# Patient Record
Sex: FEMALE | Race: WHITE | NOT HISPANIC OR LATINO | Employment: FULL TIME | URBAN - NONMETROPOLITAN AREA
[De-identification: names, ages, dates, MRNs, and addresses within clinical notes are randomized per-mention and may not be internally consistent; named-entity substitution may affect disease eponyms.]

---

## 2018-02-14 ENCOUNTER — OFFICE VISIT CONVERTED (OUTPATIENT)
Dept: FAMILY MEDICINE CLINIC | Age: 33
End: 2018-02-14
Attending: NURSE PRACTITIONER

## 2018-08-31 ENCOUNTER — OFFICE VISIT CONVERTED (OUTPATIENT)
Dept: FAMILY MEDICINE CLINIC | Age: 33
End: 2018-08-31
Attending: NURSE PRACTITIONER

## 2019-04-02 ENCOUNTER — HOSPITAL ENCOUNTER (OUTPATIENT)
Dept: OTHER | Facility: HOSPITAL | Age: 34
Discharge: HOME | End: 2019-04-02
Attending: NURSE PRACTITIONER

## 2019-04-02 ENCOUNTER — OFFICE VISIT CONVERTED (OUTPATIENT)
Dept: FAMILY MEDICINE CLINIC | Age: 34
End: 2019-04-02
Attending: NURSE PRACTITIONER

## 2019-07-09 ENCOUNTER — OFFICE VISIT CONVERTED (OUTPATIENT)
Dept: FAMILY MEDICINE CLINIC | Age: 34
End: 2019-07-09
Attending: NURSE PRACTITIONER

## 2019-07-09 ENCOUNTER — HOSPITAL ENCOUNTER (OUTPATIENT)
Dept: OTHER | Facility: HOSPITAL | Age: 34
Discharge: HOME | End: 2019-07-09
Attending: NURSE PRACTITIONER

## 2019-07-09 LAB
ALBUMIN SERPL-MCNC: 4.5 G/DL (ref 3.5–5)
ALBUMIN/GLOB SERPL: 1.5 {RATIO} (ref 1.4–2.6)
ALP SERPL-CCNC: 67 U/L (ref 42–98)
ALT SERPL-CCNC: 28 U/L (ref 10–40)
ANION GAP SERPL CALC-SCNC: 19 MMOL/L (ref 8–19)
AST SERPL-CCNC: 29 U/L (ref 15–50)
BILIRUB SERPL-MCNC: 0.26 MG/DL (ref 0.2–1.3)
BUN SERPL-MCNC: 9 MG/DL (ref 5–25)
BUN/CREAT SERPL: 9 {RATIO} (ref 6–20)
CALCIUM SERPL-MCNC: 9.2 MG/DL (ref 8.7–10.4)
CHLORIDE SERPL-SCNC: 100 MMOL/L (ref 99–111)
CHOLEST SERPL-MCNC: 218 MG/DL (ref 107–200)
CHOLEST/HDLC SERPL: 4.7 {RATIO} (ref 3–6)
CONV CO2: 23 MMOL/L (ref 22–32)
CONV TOTAL PROTEIN: 7.5 G/DL (ref 6.3–8.2)
CREAT UR-MCNC: 0.96 MG/DL (ref 0.5–0.9)
ERYTHROCYTE [DISTWIDTH] IN BLOOD BY AUTOMATED COUNT: 11.9 % (ref 11.5–14.5)
GFR SERPLBLD BASED ON 1.73 SQ M-ARVRAT: >60 ML/MIN/{1.73_M2}
GLOBULIN UR ELPH-MCNC: 3 G/DL (ref 2–3.5)
GLUCOSE SERPL-MCNC: 82 MG/DL (ref 65–99)
HBA1C MFR BLD: 14.2 G/DL (ref 12–16)
HCT VFR BLD AUTO: 40.1 % (ref 37–47)
HDLC SERPL-MCNC: 46 MG/DL (ref 40–60)
LDLC SERPL CALC-MCNC: 124 MG/DL (ref 70–100)
MCH RBC QN AUTO: 29 PG (ref 27–31)
MCHC RBC AUTO-ENTMCNC: 35.4 G/DL (ref 33–37)
MCV RBC AUTO: 82 FL (ref 81–99)
OSMOLALITY SERPL CALC.SUM OF ELEC: 284 MOSM/KG (ref 273–304)
PLATELET # BLD AUTO: 305 10*3/UL (ref 130–400)
PMV BLD AUTO: 9.5 FL (ref 7.4–10.4)
POTASSIUM SERPL-SCNC: 4.3 MMOL/L (ref 3.5–5.3)
RBC # BLD AUTO: 4.89 10*6/UL (ref 4.2–5.4)
SODIUM SERPL-SCNC: 138 MMOL/L (ref 135–147)
TRIGL SERPL-MCNC: 242 MG/DL (ref 40–150)
TSH SERPL-ACNC: 2.78 M[IU]/L (ref 0.27–4.2)
VLDLC SERPL-MCNC: 48 MG/DL (ref 5–37)
WBC # BLD AUTO: 4.94 10*3/UL (ref 4.8–10.8)

## 2020-02-21 ENCOUNTER — OFFICE VISIT CONVERTED (OUTPATIENT)
Dept: FAMILY MEDICINE CLINIC | Age: 35
End: 2020-02-21
Attending: NURSE PRACTITIONER

## 2020-08-19 ENCOUNTER — OFFICE VISIT CONVERTED (OUTPATIENT)
Dept: FAMILY MEDICINE CLINIC | Age: 35
End: 2020-08-19
Attending: NURSE PRACTITIONER

## 2020-08-19 ENCOUNTER — HOSPITAL ENCOUNTER (OUTPATIENT)
Dept: OTHER | Facility: HOSPITAL | Age: 35
Discharge: HOME | End: 2020-08-19
Attending: NURSE PRACTITIONER

## 2020-08-20 LAB
ALBUMIN SERPL-MCNC: 4.5 G/DL (ref 3.5–5)
ALBUMIN/GLOB SERPL: 1.8 {RATIO} (ref 1.4–2.6)
ALP SERPL-CCNC: 61 U/L (ref 42–98)
ALT SERPL-CCNC: 18 U/L (ref 10–40)
ANION GAP SERPL CALC-SCNC: 15 MMOL/L (ref 8–19)
AST SERPL-CCNC: 19 U/L (ref 15–50)
BASOPHILS # BLD MANUAL: 0.02 10*3/UL (ref 0–0.2)
BASOPHILS NFR BLD MANUAL: 0.4 % (ref 0–3)
BILIRUB SERPL-MCNC: 0.3 MG/DL (ref 0.2–1.3)
BUN SERPL-MCNC: 11 MG/DL (ref 5–25)
BUN/CREAT SERPL: 12 {RATIO} (ref 6–20)
CALCIUM SERPL-MCNC: 9.7 MG/DL (ref 8.7–10.4)
CHLORIDE SERPL-SCNC: 101 MMOL/L (ref 99–111)
CHOLEST SERPL-MCNC: 275 MG/DL (ref 107–200)
CHOLEST/HDLC SERPL: 6 {RATIO} (ref 3–6)
CONV CO2: 25 MMOL/L (ref 22–32)
CONV TOTAL PROTEIN: 7 G/DL (ref 6.3–8.2)
CREAT UR-MCNC: 0.93 MG/DL (ref 0.5–0.9)
DEPRECATED RDW RBC AUTO: 35.5 FL
EOSINOPHIL # BLD MANUAL: 0.15 10*3/UL (ref 0–0.7)
EOSINOPHIL NFR BLD MANUAL: 3.2 % (ref 0–7)
ERYTHROCYTE [DISTWIDTH] IN BLOOD BY AUTOMATED COUNT: 11.7 % (ref 11.5–14.5)
GFR SERPLBLD BASED ON 1.73 SQ M-ARVRAT: >60 ML/MIN/{1.73_M2}
GLOBULIN UR ELPH-MCNC: 2.5 G/DL (ref 2–3.5)
GLUCOSE SERPL-MCNC: 79 MG/DL (ref 65–99)
GRANS (ABSOLUTE): 2.39 10*3/UL (ref 2–8)
GRANS: 51.7 % (ref 30–85)
HBA1C MFR BLD: 14.6 G/DL (ref 12–16)
HCT VFR BLD AUTO: 42.8 % (ref 37–47)
HDLC SERPL-MCNC: 46 MG/DL (ref 40–60)
IMM GRANULOCYTES # BLD: 0.01 10*3/UL (ref 0–0.54)
IMM GRANULOCYTES NFR BLD: 0.2 % (ref 0–0.43)
IRON SATN MFR SERPL: 16 % (ref 20–55)
IRON SERPL-MCNC: 72 UG/DL (ref 60–170)
LDLC SERPL CALC-MCNC: 183 MG/DL (ref 70–100)
LYMPHOCYTES # BLD MANUAL: 1.77 10*3/UL (ref 1–5)
LYMPHOCYTES NFR BLD MANUAL: 6.3 % (ref 3–10)
MCH RBC QN AUTO: 28.4 PG (ref 27–31)
MCHC RBC AUTO-ENTMCNC: 34.1 G/DL (ref 33–37)
MCV RBC AUTO: 83.3 FL (ref 81–99)
MONOCYTES # BLD AUTO: 0.29 10*3/UL (ref 0.2–1.2)
OSMOLALITY SERPL CALC.SUM OF ELEC: 282 MOSM/KG (ref 273–304)
PLATELET # BLD AUTO: 257 10*3/UL (ref 130–400)
PMV BLD AUTO: 9.7 FL (ref 7.4–10.4)
POTASSIUM SERPL-SCNC: 4.3 MMOL/L (ref 3.5–5.3)
RBC # BLD AUTO: 5.14 10*6/UL (ref 4.2–5.4)
SODIUM SERPL-SCNC: 137 MMOL/L (ref 135–147)
TIBC SERPL-MCNC: 440 UG/DL (ref 245–450)
TRANSFERRIN SERPL-MCNC: 308 MG/DL (ref 250–380)
TRIGL SERPL-MCNC: 229 MG/DL (ref 40–150)
TSH SERPL-ACNC: 1.63 M[IU]/L (ref 0.27–4.2)
VARIANT LYMPHS NFR BLD MANUAL: 38.2 % (ref 20–45)
VLDLC SERPL-MCNC: 46 MG/DL (ref 5–37)
WBC # BLD AUTO: 4.63 10*3/UL (ref 4.8–10.8)

## 2021-04-07 ENCOUNTER — OFFICE VISIT CONVERTED (OUTPATIENT)
Dept: FAMILY MEDICINE CLINIC | Age: 36
End: 2021-04-07
Attending: NURSE PRACTITIONER

## 2021-04-10 ENCOUNTER — HOSPITAL ENCOUNTER (OUTPATIENT)
Dept: OTHER | Facility: HOSPITAL | Age: 36
Discharge: HOME | End: 2021-04-10
Attending: NURSE PRACTITIONER

## 2021-04-10 LAB
BASOPHILS # BLD MANUAL: 0.01 10*3/UL (ref 0–0.2)
BASOPHILS NFR BLD MANUAL: 0.2 % (ref 0–3)
CHOLEST SERPL-MCNC: 266 MG/DL (ref 107–200)
CHOLEST/HDLC SERPL: 5.4 {RATIO} (ref 3–6)
DEPRECATED RDW RBC AUTO: 39.1 FL
EOSINOPHIL # BLD MANUAL: 0.18 10*3/UL (ref 0–0.7)
EOSINOPHIL NFR BLD MANUAL: 3.5 % (ref 0–7)
ERYTHROCYTE [DISTWIDTH] IN BLOOD BY AUTOMATED COUNT: 12.4 % (ref 11.5–14.5)
GRANS (ABSOLUTE): 2.95 10*3/UL (ref 2–8)
GRANS: 57 % (ref 30–85)
HBA1C MFR BLD: 13.9 G/DL (ref 12–16)
HCT VFR BLD AUTO: 42.5 % (ref 37–47)
HDLC SERPL-MCNC: 49 MG/DL (ref 40–60)
IMM GRANULOCYTES # BLD: 0.03 10*3/UL (ref 0–0.54)
IMM GRANULOCYTES NFR BLD: 0.6 % (ref 0–0.43)
IRON SATN MFR SERPL: 11 % (ref 20–55)
IRON SERPL-MCNC: 49 UG/DL (ref 60–170)
LDLC SERPL CALC-MCNC: 165 MG/DL (ref 70–100)
LYMPHOCYTES # BLD MANUAL: 1.71 10*3/UL (ref 1–5)
LYMPHOCYTES NFR BLD MANUAL: 5.6 % (ref 3–10)
MCH RBC QN AUTO: 28.1 PG (ref 27–31)
MCHC RBC AUTO-ENTMCNC: 32.7 G/DL (ref 33–37)
MCV RBC AUTO: 85.9 FL (ref 81–99)
MONOCYTES # BLD AUTO: 0.29 10*3/UL (ref 0.2–1.2)
PLATELET # BLD AUTO: 281 10*3/UL (ref 130–400)
PMV BLD AUTO: 9.7 FL (ref 7.4–10.4)
RBC # BLD AUTO: 4.95 10*6/UL (ref 4.2–5.4)
TIBC SERPL-MCNC: 440 UG/DL (ref 245–450)
TRANSFERRIN SERPL-MCNC: 308 MG/DL (ref 250–380)
TRIGL SERPL-MCNC: 258 MG/DL (ref 40–150)
VARIANT LYMPHS NFR BLD MANUAL: 33.1 % (ref 20–45)
VLDLC SERPL-MCNC: 52 MG/DL (ref 5–37)
WBC # BLD AUTO: 5.17 10*3/UL (ref 4.8–10.8)

## 2021-05-18 NOTE — PROGRESS NOTES
Katrina Bahena  1985     Office/Outpatient Visit    Visit Date: Fri, Feb 21, 2020 04:26 pm    Provider: Suly Ramirez N.P. (Assistant: Nabila Cabrera MA)    Location: [facility]        Electronically signed by Suly Ramirez N.P. on  02/25/2020 08:50:41 AM                             Subjective:        CC: Ms. Bahena is a 34 year old White female.  This is a follow-up visit.  med refill;         HPI: lmp 3 wks ago          Patient to be evaluated for generalized anxiety disorder.  stable on lexapro and prn buspirone.  denies side effects .  requests refills.            Dx with mixed hyperlipidemia; current treatment includes a low cholesterol/low fat diet.  Compliance with treatment has been good; she maintains her low cholesterol diet and follows up as directed.  She denies experiencing any hypercholesterolemia related symptoms.  does want to see dietitian to discuss eating to promote weight loss and healthy eating.      ROS:     CONSTITUTIONAL:  Positive for fatigue ( mild ).      CARDIOVASCULAR:  Negative for chest pain, palpitations, tachycardia, orthopnea, and edema.      RESPIRATORY:  Negative for cough, dyspnea, and hemoptysis.      MUSCULOSKELETAL:  Negative for arthralgias, back pain, and myalgias.      NEUROLOGICAL:  Negative for dizziness, headaches, paresthesias, and weakness.      PSYCHIATRIC:  Positive for anxiety ( (stable) ).   Negative for crying spells, depression, sleep disturbance or suicidal thoughts.          Past Medical History / Family History / Social History:         Last Reviewed on 7/09/2019 02:18 PM by Suly Ramirez    Past Medical History:                 PAST MEDICAL HISTORY         Sleep Apnea: dx'd in july 2019; (+) sleep study;     Allergies: seasonal;     Anxiety         GYNECOLOGICAL HISTORY:    G0    No problems with menstrual cycles.    Menarche occurred at age 13.          CURRENT MEDICAL PROVIDERS:    Allergist         PREVENTIVE HEALTH MAINTENANCE              EYE EXAM: was last done jan 2019- glasses     PAP SMEAR: was last done 2016 with normal results Dr. Tika Santamaria         Surgical History:     NONE         Family History:     Father: Hypertension; Hyperlipidemia     Mother: Type 2 Diabetes; gestational diabetes;  Osteoarthritis     Brother(s): Healthy; 5 brother(s) total     Sister(s): 4 sister(s) total;  Autism     Paternal Grandfather: Medical history unknown     Paternal Grandmother: Medical history unknown     Maternal Grandfather: Medical history unknown     Maternal Grandmother: Cerebrovascular Accident         Social History:     Occupation: Teacher at [employer];     Marital Status: Single     Children: None         Tobacco/Alcohol/Supplements:     Last Reviewed on 2/21/2020 04:26 PM by Nabila Cabrera    Tobacco: She has never smoked.          Alcohol: Frequency: Socially     Caffeine:  She admits to consuming caffeine via tea ( 1 serving per day ).          Substance Abuse History:     Last Reviewed on 9/29/2017 10:37 AM by Annabelle Pitts        Mental Health History:     Last Reviewed on 9/29/2017 10:37 AM by Annabelle Pitts        Communicable Diseases (eg STDs):     Last Reviewed on 9/29/2017 10:37 AM by Annabelle Pitts        Current Problems:     Last Reviewed on 2/21/2020 04:34 PM by Suly Ramirez    Anemia, unspecified    Generalized anxiety disorder    Encounter for screening for diabetes mellitus    Encounter for screening for lipoid disorders    Generalized hyperhidrosis    Low back pain    Mild intermittent asthma, uncomplicated        Immunizations:     Td adult 5/12/2017    Merck 4 Valent HPV vaccine 5/30/2003    Merck 4 Valent HPV vaccine 7/30/2003    Merck 4 Valent HPV vaccine 12/8/2003    Havrix -adult dose (HepA) 5/23/2018    Fluzone (3 + years dose) 9/29/2017    Fluzone Quadrivalent (3+ years) 10/1/2018    PPD 1/5/2010    PPD 1/6/2011    PPD 9/29/2017    PPD 7/9/2019    Adacel (Tdap) 1/6/2011        Allergies:     Last Reviewed on  2/21/2020 04:26 PM by Nabila Cabrera    Viibryd: shortness of breath         Current Medications:     Last Reviewed on 2/21/2020 04:26 PM by Nabila Cabrera    Ferrous Sulfate 325mg Tablets [1 TAB DAILY ]    Proventil HFA 90mcg/1actuation Oral Inhaler [Inhale 2 puff(s) by mouth q 4 to 6 hr]    Lexapro 20mg Tablet [1 tab daily]    Buspirone HCl 10mg Tablet [1 tab bid PRN]    Multivitamin/Mineral Supplement         Objective:        Vitals:         Historical:     7/9/2019  BP:   127/94 mm Hg ( (left arm, , sitting, );) 7/9/2019  BP:   122/103 mm Hg ( (right arm, , sitting, );) 7/9/2019  Wt:   262.2lbs4/2/2019  Wt:   261.2lbs    Current: 2/21/2020 4:31:28 PM    Ht:  5 ft, 6 in;  Wt: 274.4 lbs;  BMI: 44.3T: 97.8 F (oral);  BP: 155/98 mm Hg (left arm, sitting);  P: 78 bpm (left arm (BP Cuff), sitting);  sCr: 0.96 mg/dL;  GFR: 109.60        Repeat:     4:32:39 PM  BP:   150/106mm Hg (right arm, sitting, P-75) 4:53:1 PM  BP:   132/88mm Hg (right arm, sitting)     Exams:     PHYSICAL EXAM:     GENERAL: obese;  no apparent distress;     RESPIRATORY: normal respiratory rate and pattern with no distress; normal breath sounds with no rales, rhonchi, wheezes or rubs;     CARDIOVASCULAR: normal rate; rhythm is regular;     MUSCULOSKELETAL:  Normal range of motion, strength and tone;     NEUROLOGIC: mental status: alert and oriented x 3; GROSSLY INTACT     PSYCHIATRIC:  appropriate affect and demeanor; normal speech pattern; grossly normal memory;         Assessment:         F41.1   Generalized anxiety disorder       E78.2   Mixed hyperlipidemia           ORDERS:         Meds Prescribed:       [Refilled] Lexapro 20 mg oral tablet [1 tab daily], #90 (ninety) tablets, Refills: 1 (one)       [Refilled] busPIRone 10 mg oral tablet [1 tab bid PRN], #180 (one hundred and eighty) tablets, Refills: 1 (one)         Lab Orders:       [id number]  [facility] Comp. Metabolic Panel  (Send-Out)            49040  [facility] Lipid Panel  (Send-Out)               Procedures Ordered:       REFER  Referral to Specialist or Other Facility  (Send-Out)                      Plan:         Generalized anxiety disorder        RECOMMENDATIONS given include: avoidance of caffeine, stress reduction, and follow up in 6 months or sooner prn..            Prescriptions:       [Refilled] Lexapro 20 mg oral tablet [1 tab daily], #90 (ninety) tablets, Refills: 1 (one)       [Refilled] busPIRone 10 mg oral tablet [1 tab bid PRN], #180 (one hundred and eighty) tablets, Refills: 1 (one)         Mixed hyperlipidemia    LABORATORY:  Labs ordered to be performed today include Comprehensive metabolic panel and lipid panel.      REFERRALS:  Referral initiated to Dietitian.            Orders:       [id number]  [facility] Comp. Metabolic Panel  (Send-Out)            [id number]  [facility] Lipid Panel  (Send-Out)            REFER  Referral to Specialist or Other Facility  (Send-Out)                  Patient Recommendations:        For  Generalized anxiety disorder:    Try to avoid or reduce the amount of caffeine intake. Try stress reduction methods to reduce the frequency or lessen the severity of anxiety episodes.              Charge Capture:         Primary Diagnosis:     F41.1  Generalized anxiety disorder           Orders:      25804  Office/outpatient visit; established patient, level 3  (In-House)              E78.2  Mixed hyperlipidemia

## 2021-05-18 NOTE — PROGRESS NOTES
Katrina BahenaKeke 1985     Office/Outpatient Visit    Visit Date: Tue, Jul 9, 2019 02:05 pm    Provider: Suly Ramirez N.P. (Assistant: Sarah Spurling, MA)    Location: [facility]        Electronically signed by Suly Ramirez N.P. on  07/09/2019 07:26:22 PM                             SUBJECTIVE:        CC:     Ms. Bahena is a 33 year old White female.  Prevenative Exam and she would like a new inhaler.;         HPI:         Patient to be evaluated for health checkup.  Her last physical exam was 2 years ago.  Her last menstrual period was last week.  She is not currently using any form of contraception.  She performs breast self-exams monthly.          PHQ-9 Depression Screening: Completed form scanned and in chart; Total Score 2 Alcohol Consumption Screening: Completed form scanned and in chart; Total Score 1     ROS:     CONSTITUTIONAL:  Positive for fatigue ( moderate ).      EYES:  Positive for use of glasses.      E/N/T:  Negative for hearing problems, E/N/T pain, congestion, rhinorrhea, epistaxis, hoarseness, and dental problems.      CARDIOVASCULAR:  Negative for chest pain, palpitations, tachycardia, orthopnea, and edema.      RESPIRATORY:  Positive for wheezing ( occasional ).   Negative for recent cough.      GASTROINTESTINAL:  Positive for heartburn.   Negative for abdominal pain, constipation, diarrhea, nausea or vomiting.      MUSCULOSKELETAL:  Negative for arthralgias, back pain, and myalgias.      NEUROLOGICAL:  Negative for dizziness, headaches, paresthesias, and weakness.      ENDOCRINE:  Negative for hair loss, heat/cold intolerance, polydipsia, and polyphagia.      ALLERGIC/IMMUNOLOGIC:  Positive for seasonal allergies.      PSYCHIATRIC:  Positive for anxiety ( (stable) ).          PMH/FMH/SH:     Last Reviewed on 7/09/2019 02:18 PM by Suly Ramirez    Past Medical History:                 PAST MEDICAL HISTORY         Allergies: seasonal;     Anxiety         GYNECOLOGICAL  HISTORY:    G0    No problems with menstrual cycles.    Menarche occurred at age 13.          PREVENTIVE HEALTH MAINTENANCE             EYE EXAM: was last done jan 2019- glasses     PAP SMEAR: was last done 2016 with normal results Dr. Tika Santamaria         Surgical History:     NONE         Family History:     Father: Hypertension; Hyperlipidemia     Mother: Type 2 Diabetes; gestational diabetes;  Osteoarthritis     Brother(s): Healthy; 5 brother(s) total     Sister(s): 4 sister(s) total;  Autism     Paternal Grandfather: Medical history unknown     Paternal Grandmother: Medical history unknown     Maternal Grandfather: Medical history unknown     Maternal Grandmother: Cerebrovascular Accident         Social History:     Occupation: Teacher at [employer];     Marital Status: Single     Children: None         Tobacco/Alcohol/Supplements:     Last Reviewed on 7/09/2019 02:15 PM by Spurling, Sarah C    Tobacco: She has never smoked.          Alcohol: Frequency: Socially     Caffeine:  She admits to consuming caffeine via tea ( 1 serving per day ).          Substance Abuse History:     Last Reviewed on 9/29/2017 10:37 AM by Annabelle Pitts        Mental Health History:     Last Reviewed on 9/29/2017 10:37 AM by Annabelle Pitts        Communicable Diseases (eg STDs):     Last Reviewed on 9/29/2017 10:37 AM by Annabelle Pitts            Current Problems:     Last Reviewed on 9/29/2017 10:37 AM by Annabelle Pitts    Low back pain     Screening for cholesterol level     Increased sweating     Screening for diabetes mellitus     Anemia, unspecified     Unspecified anemia     Anxiety, generalized         Immunizations: 2nd hep a rec'd thru school     Td adult 5/12/2017     Merck 4 Valent HPV vaccine 5/30/2003     Merck 4 Valent HPV vaccine 7/30/2003     Merck 4 Valent HPV vaccine 12/8/2003     Havrix -adult dose (HepA) 5/23/2018     Fluzone (3 + years dose) 9/29/2017     Fluzone Quadrivalent (3+ years) 10/1/2018     PPD 1/5/2010     PPD  1/6/2011     PPD 9/29/2017     Adacel (Tdap) 1/6/2011         Allergies:     Last Reviewed on 4/02/2019 01:58 PM by Verónica Vargasbryd: shortness of breath        Current Medications:     Last Reviewed on 7/09/2019 02:15 PM by Spurling, Sarah C    Buspirone HCl 10mg Tablet 1 tab bid PRN     Lexapro 20mg Tablet 1 tab daily     Ferrous Sulfate 325mg Tablets 1 TAB DAILY     Proventil HFA 90mcg/1actuation Oral Inhaler Inhale 2 puff(s) by mouth q 4 to 6 hr     Multivitamin/Mineral Supplement         OBJECTIVE:        Vitals:         Historical:     04/02/2019  BP:   127/84 mm Hg ( (right arm, , sitting, );)     04/02/2019  Wt:   261.2lbs        Current: 7/9/2019 2:19:36 PM    Ht:  5 ft, 6 in;  Wt: 262.2 lbs;  BMI: 42.3    T: 98.5 F (oral);  BP: 122/103 mm Hg (right arm, sitting);  P: 87 bpm (right arm (BP Cuff), sitting);  sCr: 0.85 mg/dL;  GFR: 122.51        Repeat:     2:20:02 PM     BP:   127/94mm Hg (left arm, sitting, second take.)         Exams:     PHYSICAL EXAM:     GENERAL: no apparent distress;     EYES: PERRL, EOMI     E/N/T: EARS: bilateral TMs are normal;  NOSE: normal nasal mucosa; OROPHARYNX: posterior pharynx, including tonsils, tongue, and uvula are normal;     NECK: thyroid is non-palpable;     RESPIRATORY: normal respiratory rate and pattern with no distress; normal breath sounds with no rales, rhonchi, wheezes or rubs;     CARDIOVASCULAR: normal rate; rhythm is regular;     GASTROINTESTINAL: nontender; normal bowel sounds; no organomegaly;     LYMPHATIC: no enlargement of cervical or facial nodes;     MUSCULOSKELETAL:  Normal range of motion, strength and tone;     NEUROLOGIC: mental status: alert and oriented x 3; GROSSLY INTACT     PSYCHIATRIC:  appropriate affect and demeanor; normal speech pattern; grossly normal memory;         Procedures:     TB screening     1. PPD placement: 0.1 cc unit dose given intradermally right forearm; administered by [facility];  lot number [id number]; expires 3-14-21;  Pt was instructed to come back on Wednesday 7-11-19 @ 3:00pm or after, and go to the allergy room to have it read. ./SCS             ASSESSMENT           V70.0   Z00.00  Health checkup              DDx:     V79.0   Z13.31  Screening for depression              DDx:     300.02   F41.1  Anxiety, generalized              DDx:     V74.1   Z11.1  TB screening              DDx:     493.00   J45.20  Allergy-induced asthma              DDx:         ORDERS:         Meds Prescribed:       Refill of: Buspirone HCl 10mg Tablet 1 tab bid PRN  #180 (One Walker and Eighty) tablet(s) Refills: 1       Refill of: Lexapro (Escitalopram Oxalate) 20mg Tablet 1 tab daily  #90 (Ninety) tablet(s) Refills: 1       Refill of: Proventil HFA (Albuterol) 90mcg/1actuation Oral Inhaler Inhale 2 puff(s) by mouth q 4 to 6 hr  #1 (One) 6.7 gm inhaler Refills: 2         Lab Orders:       52560  CB2 - [facility] CBC w/o diff  (Send-Out)         66095  [facility] Comp. Metabolic Panel  (Send-Out)         05002  [facility] Lipid Panel  (Send-Out)         12575  TSH [facility] TSH  (Send-Out)         27518  PPD TB test  (In-House)           Other Orders:         Depression screen negative  (In-House)                   PLAN:          Health checkup     LABORATORY:  Labs ordered to be performed today include CBC W/O DIFF, Comprehensive metabolic panel, lipid panel, and TSH.      COUNSELING was provided today regarding the following topics: healthy eating habits, low cholesterol diet, low salt diet, regular exercise, breast self-exam, contraception, and STD prevention.            Orders:       34967  BDCB2 - [facility] CBC w/o diff  (Send-Out)         10896  COMP - [facility] Comp. Metabolic Panel  (Send-Out)         49084  LPDP - [facility] Lipid Panel  (Send-Out)         10004  TSH [facility] TSH  (Send-Out)             Patient Education Handouts:       Physical Exam 30-39 year, Female           Screening for depression     MIPS PHQ-9 Depression Screening: Completed form scanned and  in chart; Total Score 2; Negative Depression Screen           Orders:         Depression screen negative  (In-House)            Anxiety, generalized           Prescriptions:       Refill of: Buspirone HCl 10mg Tablet 1 tab bid PRN  #180 (One [place] and Eighty) tablet(s) Refills: 1       Refill of: Lexapro (Escitalopram Oxalate) 20mg Tablet 1 tab daily  #90 (Ninety) tablet(s) Refills: 1          TB screening           Orders:       62884  PPD TB test  (In-House)            Allergy-induced asthma           Prescriptions:       Refill of: Proventil HFA (Albuterol) 90mcg/1actuation Oral Inhaler Inhale 2 puff(s) by mouth q 4 to 6 hr  #1 (One) 6.7 gm inhaler Refills: 2             Patient Recommendations:        For  Health checkup:         Limit dietary intake of fat (especially saturated fat) and cholesterol.  Eat a variety of foods, including plenty of fruits, vegetables, and grain containg fiber, limit fat intake to 30% of total calories. Balance caloric intake with energy expended.    Maintaining regular physical activity is advised to help prevent heart disease, hypertension, diabetes, and obesity.    You should regularly examine your breasts, easily done while in the shower or with lotion.  Feel and look for differences in consistency from month to month, especially noting knots or lumps, changes in skin appearance, nipple retraction or discharge.    Sexually transmitted diseases may be prevented by abstaining from sexual activity or avoiding sexual contact with high risk partners, and consistently using a condom or female barrier contraceptives plus spermacide.              CHARGE CAPTURE           **Please note: ICD descriptions below are intended for billing purposes only and may not represent clinical diagnoses**        Primary Diagnosis:         V70.0 Health checkup            Z00.00    Encounter for general adult medical examination without abnormal findings              Orders:          25222    Preventive medicine, established patient, age 18-39 years  (In-House)           V79.0 Screening for depression            Z13.31    Encounter for screening for depression              Orders:             Depression screen negative  (In-House)           300.02 Anxiety, generalized            F41.1    Generalized anxiety disorder    V74.1 TB screening            Z11.1    Encounter for screening for respiratory tuberculosis              Orders:          69718   PPD TB test  (In-House)           493.00 Allergy-induced asthma            J45.20    Mild intermittent asthma, uncomplicated        ADDENDUMS:      ____________________________________    Addendum: 07/11/2019 03:14 PM - Rust, Rosalina        PPD 0mm induration noted to Rt forearm./pr

## 2021-05-18 NOTE — PROGRESS NOTES
Katrina Bahena  1985     Office/Outpatient Visit    Visit Date: Wed, Apr 7, 2021 04:09 pm    Provider: Suly Ramirez N.P. (Assistant: Jeni Loo MA)    Location: [facility]        Electronically signed by Suly Ramirez N.P. on  04/11/2021 08:25:59 PM                             Subjective:        CC: Ms. Bahena is a 35 year old White female.  physical;         HPI: lmp march 12      too soon to bill for physical.  this is for emplyment.    Ms. Bahena presents with encounter for general adult medical examination without abnormal findings.  physical exam for insurance not due until august 2021.  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  she has not been exposed to TB or any other communicable illness.  she has no limitations that would interfere with performing job functions.  will need to have a paper completed.            Anemia, unspecified details; on iron supplement.  due for recheck.            With regard to the generalized anxiety disorder, stable on escitalopram.  denies side effects.  requests refills.            With regard to the mixed hyperlipidemia, current treatment includes a low cholesterol/low fat diet.  Compliance with treatment has been good.  She denies experiencing any hypercholesterolemia related symptoms.      ROS:     CONSTITUTIONAL:  Negative for chills, fatigue, fever, and weight change.      CARDIOVASCULAR:  Negative for chest pain, palpitations, tachycardia, orthopnea, and edema.      RESPIRATORY:  Negative for cough, dyspnea, and hemoptysis.      GASTROINTESTINAL:  Negative for abdominal pain, heartburn, constipation, diarrhea, and stool changes.      MUSCULOSKELETAL:  Negative for arthralgias, back pain, and myalgias.      NEUROLOGICAL:  Negative for dizziness, headaches, paresthesias, and weakness.      PSYCHIATRIC:  Positive for anxiety ( (stable) ).          Past Medical History / Family History / Social History:         Last  Reviewed on 4/07/2021 04:45 PM by Suly Ramirez    Past Medical History:                 PAST MEDICAL HISTORY         Sleep Apnea: dx'd in july 2019; (+) sleep study;     Allergies: seasonal;     Anxiety         GYNECOLOGICAL HISTORY:    G0    No problems with menstrual cycles.    Menarche occurred at age 13.          CURRENT MEDICAL PROVIDERS:    Allergist: Family allergy and Asthma - injections         PREVENTIVE HEALTH MAINTENANCE             DENTAL CLEANING: was last done 2020 - [place]     EYE EXAM: was last done jan 2020- glasses     PAP SMEAR: was last done 2016 with normal results Dr. Tika Santamaria - / [place]         Surgical History:     NONE         Family History:     Father: Hypertension; Hyperlipidemia     Mother: Type 2 Diabetes; gestational diabetes;  Osteoarthritis     Brother(s): Healthy; 5 brother(s) total     Sister(s): 4 sister(s) total;  Autism     Paternal Grandfather: Medical history unknown     Paternal Grandmother: Medical history unknown     Maternal Grandfather: Medical history unknown     Maternal Grandmother: Cerebrovascular Accident         Social History:     Occupation: Teacher at [employer];     Marital Status: Single     Children: None         Tobacco/Alcohol/Supplements:     Last Reviewed on 4/07/2021 04:11 PM by Jeni Loo    Tobacco: She has never smoked.          Alcohol: Frequency: Socially    rarely;     Caffeine:  She admits to consuming caffeine via tea ( 1 serving per day ).          Substance Abuse History:     Last Reviewed on 8/19/2020 01:15 PM by Brittany Fields    None         Mental Health History:     Last Reviewed on 8/19/2020 01:15 PM by Brittany Fields        Generalized Anxiety Disorder         Communicable Diseases (eg STDs):     Last Reviewed on 8/19/2020 01:15 PM by Brittany Fields    Reportable health conditions; NEGATIVE         Current Problems:     Last Reviewed on 8/19/2020 01:15 PM by Brittany Fields    Anemia, unspecified     Generalized anxiety disorder    Mild intermittent asthma, uncomplicated    Mixed hyperlipidemia    Sleep apnea, unspecified    Encounter for general adult medical examination without abnormal findings    Encounter for screening for respiratory tuberculosis    Encounter for screening for depression    Allergic rhinitis, unspecified        Immunizations:     influenza, injectable, quadrivalent 10/1/2019    Td adult 5/12/2017    Merck 4 Valent HPV vaccine 5/30/2003    Merck 4 Valent HPV vaccine 7/30/2003    Merck 4 Valent HPV vaccine 12/8/2003    Havrix -adult dose (HepA) 5/23/2018    Fluzone (3 + years dose) 9/29/2017    Fluzone Quadrivalent (3+ years) 10/1/2018    PPD 1/5/2010    PPD 1/6/2011    PPD 9/29/2017    PPD 7/9/2019    Adacel (Tdap) 1/6/2011        Allergies:     Last Reviewed on 8/19/2020 01:15 PM by Brittany Fieldsd: shortness of breath         Current Medications:     Last Reviewed on 4/07/2021 04:11 PM by Jeni Loo    Ferrous Sulfate 325mg Tablets [1 TAB DAILY ]    Proventil HFA 90mcg/1actuation Oral Inhaler [Inhale 2 puff(s) by mouth q 4 to 6 hr]    escitalopram oxalate 20 mg oral tablet [TAKE ONE TABLET BY MOUTH DAILY]    busPIRone 10 mg oral tablet [1 tab bid PRN]    Multivitamin/Mineral Supplement         Objective:        Vitals:         Historical:     8/19/2020  BP:   132/89 mm Hg ( (right arm, , sitting, );) 8/19/2020  Wt:   271.2lbs    Current: 4/7/2021 4:13:08 PM    Ht:  5 ft, 6 in;  Wt: 279 lbs;  BMI: 45.0T: 96.9 F (temporal);  BP: 132/85 mm Hg (left arm, sitting);  P: 76 bpm (left arm (BP Cuff), sitting);  sCr: 0.93 mg/dL;  GFR: 112.91        Exams:     PHYSICAL EXAM:     GENERAL: obese;  no apparent distress;     EYES: PERRL, EOMI     E/N/T: EARS: bilateral TMs are normal;  OROPHARYNX: posterior pharynx, including tonsils, tongue, and uvula are normal;     RESPIRATORY: normal respiratory rate and pattern with no distress; normal breath sounds with no rales, rhonchi, wheezes  or rubs;     CARDIOVASCULAR: normal rate; rhythm is regular;     MUSCULOSKELETAL:  Normal range of motion, strength and tone;     NEUROLOGIC: mental status: alert and oriented x 3; GROSSLY INTACT     PSYCHIATRIC:  appropriate affect and demeanor; normal speech pattern; grossly normal memory;         Procedures:     Encounter for screening for respiratory tuberculosis    1. PPD placement: 0.1 cc unit dose given intradermally left forearm; administered by kit;  lot number [id number]; expires 2/3/23; Given at 4:54 pm/kit  PT CAME IN ON 4/10/21 TO HAVE PPD READ AT 8:52AM READ AS 0MM INDURATION/DJ             Assessment:         Z11.1   Encounter for screening for respiratory tuberculosis       D64.9   Anemia, unspecified       F41.1   Generalized anxiety disorder       E78.2   Mixed hyperlipidemia           ORDERS:         Meds Prescribed:       [Refilled] escitalopram oxalate 20 mg oral tablet [TAKE ONE TABLET BY MOUTH DAILY], #90 (ninety) tablets, Refills: 1 (one)         Radiology/Test Orders:       [id number]  PPD TB test  (In-House)              Lab Orders:       [id number]  BDCBC - [facility] CBC with 3 part diff  (Send-Out)            [id number]  IRONP - [facility] Iron and TIBC  (Send-Out)            [id number]  LPDP - [facility] Lipid Panel  (Send-Out)                      Plan:         Encounter for screening for respiratory tuberculosis        RECOMMENDATIONS given include: if ppd negative she is free of communicable illness to work for de[employer] of education..            Orders:       [id number]  PPD TB test  (In-House)              Anemia, unspecified    LABORATORY:  Labs ordered to be performed today include Anemia profile CBC Serum iron.  MIPS Vaccines Flu and Pneumonia updated in Shot record           Orders:       [id number]  BDCBC - [facility] CBC with 3 part diff  (Send-Out)            [id number]  IRONP - [facility] Iron and TIBC  (Send-Out)              Generalized anxiety disorder          Prescriptions:       [Refilled] escitalopram oxalate 20 mg oral tablet [TAKE ONE TABLET  BY MOUTH DAILY], #90 (ninety) tablets, Refills: 1 (one)         Mixed hyperlipidemia    LABORATORY:  Labs ordered to be performed today include lipid panel.      RECOMMENDATIONS given include: exercise, low cholesterol/low fat diet, and weight loss.            Orders:       [id number]  [facility] Lipid Panel  (Send-Out)                  Patient Recommendations:        For  Mixed hyperlipidemia:    Maintain a regular exercise program. Reduce the amount of cholesterol and saturated fat in your diet. Try to lose some weight; even modest weight reduction can improve your blood pressure.              Charge Capture:         Primary Diagnosis:     Z11.1  Encounter for screening for respiratory tuberculosis           Orders:      [id number]  Office/outpatient visit; established patient, level 4  (In-House)            44801  PPD TB test  (In-House)              D64.9  Anemia, unspecified     F41.1  Generalized anxiety disorder     E78.2  Mixed hyperlipidemia

## 2021-05-18 NOTE — PROGRESS NOTES
Katrina Bahena 1985     Office/Outpatient Visit    Visit Date: Wed, Feb 14, 2018 03:53 pm    Provider: Suly Ramirez N.P. (Assistant: Maia Mcclelland MA)    Location: [facility]        Electronically signed by Suly Ramirez N.P. on  02/15/2018 11:41:23 AM                             SUBJECTIVE:        CC:     Ms. Bahena is a 32 year old White female.  Patient is here for routine check up and medication refills.;         HPI: lmp current.  usually lasts 4 days.         Ms. Bahena presents with anxiety, generalized.  Her anxiety disorder was originally diagnosed 4 years ago.  Current treatment includes Lexapro and BuSpar.  doing well.  denies side effects.  requests refills.          With regard to the anemia, unspecified, due for recheck.  previous episode caused by heavy menses.  improved on current bcp per dr kit rodriguez.  will be decreasing dose of bcp soon and to see how she does.  denies any  unexplained or excessive bleeding.      ROS:     CONSTITUTIONAL:  Negative for chills, fatigue, fever, and weight change.      CARDIOVASCULAR:  Negative for chest pain, palpitations, tachycardia, orthopnea, and edema.      RESPIRATORY:  Negative for cough, dyspnea, and hemoptysis.      MUSCULOSKELETAL:  Negative for arthralgias, back pain, and myalgias.      NEUROLOGICAL:  Negative for dizziness, headaches, paresthesias, and weakness.      ENDOCRINE:  Negative for hair loss, heat/cold intolerance, polydipsia, and polyphagia.      PSYCHIATRIC:  Positive for anxiety ( (improved, stable) ).   Negative for sleep disturbance or suicidal thoughts.          PMH/FMH/SH:     Last Reviewed on 9/29/2017 10:37 AM by Annabelle Pitts    Past Medical History:                 PAST MEDICAL HISTORY         Allergies: seasonal;     Anxiety         GYNECOLOGICAL HISTORY:    G0    No problems with menstrual cycles.    Menarche occurred at age 13.          PREVENTIVE HEALTH MAINTENANCE             PAP SMEAR: was last done 2016  with normal results Dr. Tika Santamaria         Surgical History:     NONE         Family History:     Father: Hypertension; Hyperlipidemia     Mother: gestational diabetes;  Osteoarthritis     Brother(s): Healthy; 5 brother(s) total     Sister(s): 4 sister(s) total;  Autism     Paternal Grandfather: Medical history unknown     Paternal Grandmother: Medical history unknown     Maternal Grandfather: Medical history unknown     Maternal Grandmother: Cerebrovascular Accident         Social History:     Occupation: Teacher at [employer];     Marital Status: Single     Children: None         Tobacco/Alcohol/Supplements:     Last Reviewed on 2/14/2018 03:55 PM by Maia Mcclelland    Tobacco: She has never smoked.          Alcohol: Frequency: Socially     Caffeine:  She admits to consuming caffeine via tea ( 1 serving per day ).          Substance Abuse History:     Last Reviewed on 9/29/2017 10:37 AM by Annabelle Pitts        Mental Health History:     Last Reviewed on 9/29/2017 10:37 AM by Annabelle Pitts        Communicable Diseases (eg STDs):     Last Reviewed on 9/29/2017 10:37 AM by Annabelle Pitts            Current Problems:     Last Reviewed on 9/29/2017 10:37 AM by Annabelle Pitts    Screening for diabetes mellitus     Screening for cholesterol level     Anemia, unspecified     Unspecified anemia     Anxiety, generalized         Immunizations:     Td adult 5/12/2017     Merck 4 Valent HPV vaccine 5/30/2003     Merck 4 Valent HPV vaccine 7/30/2003     Merck 4 Valent HPV vaccine 12/8/2003     Fluzone (3 + years dose) 9/29/2017     PPD 1/5/2010     PPD 1/6/2011     PPD 9/29/2017     Adacel (Tdap) 1/6/2011         Allergies:     Last Reviewed on 2/14/2018 03:53 PM by Maia Mcclelland    Viibryd: shortness of breath        Current Medications:     Last Reviewed on 2/14/2018 03:53 PM by Maia Mcclelland    Lexapro 20mg Tablet 1 tab daily     Ferrous Sulfate 325mg Tablets 1 TAB DAILY     Buspirone HCl 10mg Tablet 1 tab  bid PRN     Proventil HFA 90mcg/1actuation Oral Inhaler Inhale 2 puff(s) by mouth q 4 to 6 hr     Ogestrel 28 Tablet Take 1 tablet(s) by mouth daily as directed.     iron otc daily     Multivitamin/Mineral Supplement         OBJECTIVE:        Vitals:         Historical:     09/29/2017  BP:   121/89 mm Hg ( (left arm, , sitting, );)     09/29/2017  Wt:   246.5lbs        Current: 2/14/2018 3:55:10 PM    Ht:  5 ft, 6 in;  Wt: 241.2 lbs;  BMI: 38.9    T: 98.9 F (oral);  BP: 119/74 mm Hg (left arm, sitting);  P: 67 bpm (left arm (BP Cuff), sitting);  sCr: 0.84 mg/dL;  GFR: 120.72        Exams:     PHYSICAL EXAM:     GENERAL:  well developed and nourished; appropriately groomed; in no apparent distress;     NECK: thyroid is non-palpable;     RESPIRATORY: normal respiratory rate and pattern with no distress; normal breath sounds with no rales, rhonchi, wheezes or rubs;     CARDIOVASCULAR: normal rate; rhythm is regular;     LYMPHATIC: no enlargement of cervical or facial nodes;     MUSCULOSKELETAL:  Normal range of motion, strength and tone;     NEUROLOGIC: mental status: alert and oriented x 3; GROSSLY INTACT     PSYCHIATRIC:  appropriate affect and demeanor; normal speech pattern; grossly normal memory;         ASSESSMENT           300.02   F41.1  Anxiety, generalized              DDx:     285.9   D64.9  Anemia, unspecified              DDx:         ORDERS:         Meds Prescribed:       Refill of: Lexapro (Escitalopram Oxalate) 20mg Tablet 1 tab daily  #90 (Ninety) tablet(s) Refills: 1       Refill of: Buspirone HCl 10mg Tablet 1 tab bid PRN  #180 (One [facility] [address]) tablet(s) Refills: 1         Lab Orders:       [id number]  [facility] CBC with 3 part diff  (Send-Out)                   PLAN:          Anxiety, generalized           Prescriptions:       Refill of: Lexapro (Escitalopram Oxalate) 20mg Tablet 1 tab daily  #90 (Ninety) tablet(s) Refills: 1       Refill of: Buspirone HCl 10mg Tablet 1 tab bid PRN  #180 (One  [facility]) tablet(s) Refills: 1          Anemia, unspecified     LABORATORY:  Labs ordered to be performed today include CBC.            Orders:       [id number]  [facility] CBC with 3 part diff  (Send-Out)               CHARGE CAPTURE           **Please note: ICD descriptions below are intended for billing purposes only and may not represent clinical diagnoses**        Primary Diagnosis:         300.02 Anxiety, generalized            F41.1    Generalized anxiety disorder              Orders:          [id number]   Office/outpatient visit; established patient, level 3  (In-House)           285.9 Anemia, unspecified            D64.9    Anemia, unspecified

## 2021-05-18 NOTE — PROGRESS NOTES
"Katrina Bahena 1985     Office/Outpatient Visit    Visit Date: Tue, Apr 2, 2019 01:55 pm    Provider: Suly Ramirez N.P. (Assistant: Verónica Vargas MA)    Location: [facility]        Electronically signed by Suly Ramirez N.P. on  04/04/2019 10:08:36 PM                             SUBJECTIVE:        CC:     Ms. Bahena is a 33 year old White female.  She presents with sore throat x 1 day. Patient also states she needs medication refills.          HPI:         She complains of a sore throat.  This began yesterday.  Associated symptoms include fever, nasal congestion and \"swollen glands\".  She denies rhinorrhea.  She denies exposure to ill contacts.  She has already tried to relieve the symptoms with acetaminophen.          Additionally, she presents with history of anxiety, generalized.  doing well on current buspar and lexapro.  denies side effects.  requests refills.          Concerning low back pain, the discomfort is most prominent in the lower lumbar spine.  The pain does not radiate.  She characterizes it as intermittent, moderate in intensity, and aching.  This is an acute episode with no prior history of back pain.  She states that the current episode of pain started 2 weeks ago.  She does not recall any precipitating event or injury.  She denies any associated symptoms.      ROS:     CONSTITUTIONAL:  Positive for fatigue and fever ( low grade ).   Negative for chills.      E/N/T:  Positive for nasal congestion and sore throat.   Negative for frequent rhinorrhea.      CARDIOVASCULAR:  Negative for chest pain, palpitations, tachycardia, orthopnea, and edema.      RESPIRATORY:  Negative for cough, dyspnea, and hemoptysis.      GASTROINTESTINAL:  Negative for abdominal pain, heartburn, constipation, diarrhea, and stool changes.      MUSCULOSKELETAL:  Positive for back pain ( acute ).      NEUROLOGICAL:  Negative for dizziness, headaches, paresthesias, and weakness.      PSYCHIATRIC:  " Positive for anxiety ( (stable) ).   Negative for sleep disturbance or suicidal thoughts.          PMH/FMH/SH:     Last Reviewed on 8/31/2018 10:11 PM by Suly Ramirez    Past Medical History:                 PAST MEDICAL HISTORY         Allergies: seasonal;     Anxiety         GYNECOLOGICAL HISTORY:    G0    No problems with menstrual cycles.    Menarche occurred at age 13.          PREVENTIVE HEALTH MAINTENANCE             PAP SMEAR: was last done 2016 with normal results Dr. Tika Santamaria         Surgical History:     NONE         Family History:     Father: Hypertension; Hyperlipidemia     Mother: gestational diabetes;  Osteoarthritis     Brother(s): Healthy; 5 brother(s) total     Sister(s): 4 sister(s) total;  Autism     Paternal Grandfather: Medical history unknown     Paternal Grandmother: Medical history unknown     Maternal Grandfather: Medical history unknown     Maternal Grandmother: Cerebrovascular Accident         Social History:     Occupation: Teacher at [employer];     Marital Status: Single     Children: None         Tobacco/Alcohol/Supplements:     Last Reviewed on 8/31/2018 03:19 PM by Spurling, Sarah C    Tobacco: She has never smoked.          Alcohol: Frequency: Socially     Caffeine:  She admits to consuming caffeine via tea ( 1 serving per day ).          Substance Abuse History:     Last Reviewed on 9/29/2017 10:37 AM by Annabelle Pitts        Mental Health History:     Last Reviewed on 9/29/2017 10:37 AM by Annabelle Pitts        Communicable Diseases (eg STDs):     Last Reviewed on 9/29/2017 10:37 AM by Annabelle Pitts            Current Problems:     Last Reviewed on 9/29/2017 10:37 AM by Annabelle Pitts    Screening for cholesterol level     Increased sweating     Screening for diabetes mellitus     Anemia, unspecified     Unspecified anemia     Anxiety, generalized         Immunizations:     Td adult 5/12/2017     Merck 4 Valent HPV vaccine 5/30/2003     Merck 4 Valent HPV vaccine  7/30/2003     Merck 4 Valent HPV vaccine 12/8/2003     Havrix -adult dose (HepA) 5/23/2018     Fluzone (3 + years dose) 9/29/2017     PPD 1/5/2010     PPD 1/6/2011     PPD 9/29/2017     Adacel (Tdap) 1/6/2011         Allergies:     Last Reviewed on 4/02/2019 01:58 PM by Verónica Vargas    Viibryd: shortness of breath        Current Medications:     Last Reviewed on 4/02/2019 01:58 PM by Verónica Vargas    Buspirone HCl 10mg Tablet 1 tab bid PRN     Lexapro 20mg Tablet 1 tab daily     Ferrous Sulfate 325mg Tablets 1 TAB DAILY     Proventil HFA 90mcg/1actuation Oral Inhaler Inhale 2 puff(s) by mouth q 4 to 6 hr     iron otc daily     Multivitamin/Mineral Supplement         OBJECTIVE:        Vitals:         Historical:     08/31/2018  BP:   129/82 mm Hg ( (left arm, , sitting, );)     02/14/2018  Wt:   241.2lbs    09/29/2017  Wt:   246.5lbs        Current: 4/2/2019 2:00:27 PM    Ht:  5 ft, 6 in;  Wt: 261.2 lbs;  BMI: 42.2    T: 99.7 F (oral);  BP: 127/84 mm Hg (right arm, sitting);  P: 107 bpm (right arm (BP Cuff), sitting);  sCr: 0.85 mg/dL;  GFR: 122.32        Exams:     PHYSICAL EXAM:     GENERAL: no apparent distress;     E/N/T: EARS: the left TM is has fluid behind it and right TM is normal;  NOSE: nasal mucosa is erythematous;  OROPHARYNX: posterior pharynx shows erythematous anterior tonsillar pillars;     RESPIRATORY: normal respiratory rate and pattern with no distress; normal breath sounds with no rales, rhonchi, wheezes or rubs;     CARDIOVASCULAR: normal rate; rhythm is regular;     LYMPHATIC: bilateral anterior cervical nodes ( 0.5 cm in size, tender, mobile );     MUSCULOSKELETAL: pain with range of motion in: back flexion and extension;     NEUROLOGIC: mental status: alert and oriented x 3; GROSSLY INTACT     PSYCHIATRIC:  appropriate affect and demeanor; normal speech pattern; grossly normal memory;         Lab/Test Results:             Rapid Strep Screen:  Positive (04/02/2019),     Performed by::   tls (04/02/2019),             ASSESSMENT           034.0   J02.0  Strep pharyngitis              DDx:     300.02   F41.1  Anxiety, generalized              DDx:     V77.91   Z13.220  Screening for cholesterol level              DDx:     724.2   M54.5  Low back pain              DDx:         ORDERS:         Meds Prescribed:       Refill of: Buspirone HCl 10mg Tablet 1 tab bid PRN  #180 (One Oxford and Eighty) tablet(s) Refills: 2       Refill of: Lexapro (Escitalopram Oxalate) 20mg Tablet 1 tab daily  #90 (Ninety) tablet(s) Refills: 1       Amoxicillin 500mg Capsules 2 caps bid x 10 days  #40 (Forty) capsule(s) Refills: 0         Radiology/Test Orders:       [id number]  Radiologic examination, spine, lumbosacral;  minimum of four views  (Send-Out)           Lab Orders:       [id number]  Group A Streptococcus detection by immunoassay with direct optical observation  (In-House)         [id number]  [facility] Lipid Panel  (Send-Out)                   PLAN:          Strep pharyngitis         RECOMMENDATIONS given include: rest, increase oral fluid intake, reduce fever with acetaminophen or ibuprofen, and Salt water gargle.            Prescriptions:       Amoxicillin 500mg Capsules 2 caps bid x 10 days  #40 (Forty) capsule(s) Refills: 0           Orders:       [id number]  Group A Streptococcus detection by immunoassay with direct optical observation  (In-House)             Patient Education Handouts:       Strep Throat (Streptococcal Pharyngitis)           Anxiety, generalized           Prescriptions:       Refill of: Buspirone HCl 10mg Tablet 1 tab bid PRN  #180 (One Oxford and Eighty) tablet(s) Refills: 2       Refill of: Lexapro (Escitalopram Oxalate) 20mg Tablet 1 tab daily  #90 (Ninety) tablet(s) Refills: 1          Screening for cholesterol level     LABORATORY:  Labs ordered to be performed today include lipid panel.            Orders:       [id number]  [facility] Lipid Panel  (Send-Out)            Low back pain         RADIOLOGY:  I  have ordered Lumbar/Sacral Spine X-ray to be done today.      RECOMMENDATIONS given include: massage.            Orders:       [id number]  Radiologic examination, spine, lumbosacral;  minimum of four views  (Send-Out)               Patient Recommendations:        For  Strep pharyngitis:     Get plenty of rest. Increase oral fluid intake. Reduce fever as needed with acetaminophen (Tylenol) or ibuprofen (Motrin, Advil, etc.). Make salt water solution by combining 1/2 to 1 teaspoons of table salt with 8 ounces of warm water.  Gargle for 10 seconds and spit out salt water.  Repeat several times a day as needed.              CHARGE CAPTURE           **Please note: ICD descriptions below are intended for billing purposes only and may not represent clinical diagnoses**        Primary Diagnosis:         034.0 Strep pharyngitis            J02.0    Streptococcal pharyngitis              Orders:          [id number]   Office/outpatient visit; established patient, level 4  (In-House)             [id number]   Group A Streptococcus detection by immunoassay with direct optical observation  (In-House)           300.02 Anxiety, generalized            F41.1    Generalized anxiety disorder    V77.91 Screening for cholesterol level            Z13.220    Encounter for screening for lipoid disorders    724.2 Low back pain            M54.5    Low back pain

## 2021-05-18 NOTE — PROGRESS NOTES
Katrina Bahena 1985     Preventive Medicine Services    Visit Date: Wed, Aug 19, 2020 12:00 am    Provider: Brittany Fields N.P. (Assistant: Carly Rascon MA )    Location:         Electronically signed by Brittany Fields N.P. on  08/26/2020 08:38:46 PM                             Subjective:        CC: Needs blood workMs. Josef is a 34 year old White female.  Preventative exam;         HPI:           Patient to be evaluated for encounter for general adult medical examination without abnormal findings.  Her last physical exam was several years ago.  Her last menstrual period was 3 weeks ago.  She is not currently using any form of contraception.  She performs breast self-exams monthly.   Her last Pap smear was >3 yrs years ago.   Preventative Health updated today.            PHQ-9 Depression Screening: Completed form scanned and in chart; Total Score 1           Anemia, unspecified details; the patient has known they are anemic for several months ago.  continues to take iron daily - unsure when iron last checked           Additionally, she presents with history of generalized anxiety disorder.  her anxiety disorder was originally diagnosed many years ago.  currently taking Lexapro and buspirone  stable doing well           Complaint of mild intermittent asthma, uncomplicated..  stable  uses inhaler spairingly           Complaint of mixed hyperlipidemia..  history of  controlled with diet           Complaint of sleep apnea, unspecified..  uses cpap  no concerns at this time           Concerning allergic rhinitis, unspecified, these started years ago.  currently under the care of [facility] getting injections weekly or biweekly - stable     ROS:     CONSTITUTIONAL:  Positive for unintentional weight gain.   Negative for chills, fatigue or fever.      EYES:  Positive for use of glasses and contact lenses.      CARDIOVASCULAR:  Negative for chest pain and pedal edema.      RESPIRATORY:   Negative for recent cough, dyspnea and frequent wheezing.      GASTROINTESTINAL:  Negative for abdominal pain, constipation, diarrhea, heartburn, nausea and vomiting.      GENITOURINARY:  Negative for dysuria and change in urine stream.      MUSCULOSKELETAL:  Negative for arthralgias and myalgias.      INTEGUMENTARY/BREAST:  Negative for atypical mole(s), pruritis and rash.      NEUROLOGICAL:  Negative for dizziness, fainting, headaches and paresthesias.      ENDOCRINE:  Negative for hair loss, hot flashes, polydipsia and polyphagia.      ALLERGIC/IMMUNOLOGIC:  Positive for perennial allergies.   Negative for seasonal allergies.      PSYCHIATRIC:  Positive for anxiety ( (controlled on medication) ).   Negative for depression, sleep disturbance or suicidal thoughts.          Past Medical History / Family History / Social History:         Last Reviewed on 8/19/2020 01:15 PM by Brittany Fields    Past Medical History:                 PAST MEDICAL HISTORY         Sleep Apnea: dx'd in july 2019; (+) sleep study;     Allergies: seasonal;     Anxiety         GYNECOLOGICAL HISTORY:    G0    No problems with menstrual cycles.    Menarche occurred at age 13.          CURRENT MEDICAL PROVIDERS:    Allergist: [facility] - injections         PREVENTIVE HEALTH MAINTENANCE             DENTAL CLEANING: was last done 2020 - [facility]     EYE EXAM: was last done jan 2020- glasses     PAP SMEAR: was last done 2016 with normal results Dr. Tika Santamaria - / [facility]         Surgical History:     NONE         Family History:     Father: Hypertension; Hyperlipidemia     Mother: Type 2 Diabetes; gestational diabetes;  Osteoarthritis     Brother(s): Healthy; 5 brother(s) total     Sister(s): 4 sister(s) total;  Autism     Paternal Grandfather: Medical history unknown     Paternal Grandmother: Medical history unknown     Maternal Grandfather: Medical history unknown     Maternal Grandmother: Cerebrovascular Accident         Social  History:     Occupation: Teacher at [employer];     Marital Status: Single     Children: None         Tobacco/Alcohol/Supplements:     Last Reviewed on 8/19/2020 01:15 PM by Brittany Fields    Tobacco: She has never smoked.          Alcohol: Frequency: Socially    rarely;     Caffeine:  She admits to consuming caffeine via tea ( 1 serving per day ).          Substance Abuse History:     Last Reviewed on 8/19/2020 01:15 PM by Brittany Fields    None         Mental Health History:     Last Reviewed on 8/19/2020 01:15 PM by Brittany Fields        Generalized Anxiety Disorder         Communicable Diseases (eg STDs):     Last Reviewed on 8/19/2020 01:15 PM by Brittany Feilds    Reportable health conditions; NEGATIVE         Current Problems:     Last Reviewed on 8/19/2020 01:15 PM by Brittany Fields    Anemia, unspecified    Generalized anxiety disorder    Mild intermittent asthma, uncomplicated    Mixed hyperlipidemia    Sleep apnea, unspecified    Encounter for general adult medical examination without abnormal findings    Encounter for screening for respiratory tuberculosis    Encounter for screening for depression    Allergic rhinitis, unspecified        Immunizations:     influenza, injectable, quadrivalent 10/1/2019    Td adult 5/12/2017    Merck 4 Valent HPV vaccine 5/30/2003    Merck 4 Valent HPV vaccine 7/30/2003    Merck 4 Valent HPV vaccine 12/8/2003    Havrix -adult dose (HepA) 5/23/2018    Fluzone (3 + years dose) 9/29/2017    Fluzone Quadrivalent (3+ years) 10/1/2018    PPD 1/5/2010    PPD 1/6/2011    PPD 9/29/2017    PPD 7/9/2019    Adacel (Tdap) 1/6/2011        Allergies:     Last Reviewed on 8/19/2020 01:15 PM by Brittany Fields    Viibryd: shortness of breath         Current Medications:     Last Reviewed on 8/19/2020 01:15 PM by Brittany Fields    Ferrous Sulfate 325mg Tablets [1 TAB DAILY ]    Proventil HFA 90mcg/1actuation Oral Inhaler [Inhale 2 puff(s) by mouth q 4 to 6  hr]    Lexapro 20 mg oral tablet [1 tab daily]    busPIRone 10 mg oral tablet [1 tab bid PRN]    Multivitamin/Mineral Supplement         Objective:        Vitals:         Historical:     2/21/2020  Wt:   274.4lbs    Current: 8/19/2020 1:00:27 PM    Ht:  5 ft, 6 in;  Wt: 271.2 lbs;  WC: 46 inches;  BMI: 43.8T: 97.1 F (temporal);  BP: 132/89 mm Hg (right arm, sitting);  P: 81 bpm (left arm (BP Cuff), sitting);  sCr: 0.96 mg/dL;  GFR: 109.06        Exams:     PHYSICAL EXAM:     GENERAL: vital signs recorded - well developed, well nourished, obese;  no apparent distress;     EYES: extraocular movements intact; PERRL;     E/N/T: EARS: external auditory canal normal;  bilateral TMs are normal;  NOSE:  normal nasal mucosa, septum, turbinates, and sinuses; OROPHARYNX:  normal mucosa, dentition, gingiva, and posterior pharynx;     NECK: range of motion is normal;     RESPIRATORY: normal appearance and symmetric expansion of chest wall; normal respiratory rate and pattern with no distress; normal breath sounds with no rales, rhonchi, wheezes or rubs;     CARDIOVASCULAR: normal rate; rhythm is regular;  no edema;     GASTROINTESTINAL: nontender; normal bowel sounds; no organomegaly;     LYMPHATIC: no enlargement of cervical or facial nodes; no supraclavicular nodes;     MUSCULOSKELETAL: normal gait; normal range of motion of all major muscle groups; no limb or joint pain with range of motion;     NEUROLOGIC: mental status: alert and oriented x 3;     PSYCHIATRIC: appropriate affect and demeanor; normal speech pattern; normal thought and perception;         Assessment:         Z00.00   Encounter for general adult medical examination without abnormal findings       Z13.31   Encounter for screening for depression       D64.9   Anemia, unspecified       Z11.1   Encounter for screening for respiratory tuberculosis       F41.1   Generalized anxiety disorder       J45.20   Mild intermittent asthma, uncomplicated       E78.2   Mixed  hyperlipidemia       G47.30   Sleep apnea, unspecified       J30.9   Allergic rhinitis, unspecified           ORDERS:         Radiology/Test Orders:       [id number]  PPD TB test  (In-House)    lot [id number]  exp 6/10/22  done R forearm 08/19/20 @ 1350  /mnp          Lab Orders:       [id number]  PHYS - [facility] PHYSICAL: CMP, CBC, TSH, LIPID: [id number], [id number], [id number], [id number]  (Send-Out)            00634  IRONP - [facility] Iron and TIBC  (Send-Out)              Other Orders:         Depression screen negative  (In-House)                      Plan:         Encounter for general adult medical examination without abnormal findings    LABORATORY:  Labs ordered to be performed today include PHYSICAL PANEL; CMP, CBC, TSH, LIPID.            Orders:       [id number]  [facility] - [facility] PHYSICAL: CMP, CBC, TSH, LIPID: [id number], [id number], [id number], [id number]  (Send-Out)              Encounter for screening for depression    MIPS PHQ-9 Depression Screening: Completed form scanned and in chart; Total Score 1; Negative Depression Screen           Orders:         Depression screen negative  (In-House)              Anemia, unspecified    LABORATORY:  Labs ordered to be performed today include Iron, serum and TIBC.            Orders:       [id number]  IRONP - [facility] Iron and TIBC  (Send-Out)              Encounter for screening for respiratory tuberculosis        IMMUNIZATIONS given today: PPD placed..            Orders:       [id number]  PPD TB test  (In-House)    lot [id number]  exp 6/10/22  done R [facility] 08/19/20 @ 1350  /mnp          Generalized anxiety disordercontinue current medication and follow up PRN        Mild intermittent asthma, uncomplicatedcontinue inhaler PRN        Sleep apnea, unspecifiedcontinue follow up with sleep center as recommended        Allergic rhinitis, unspecifiedfollow up with allergist as recommended            Charge Capture:         Primary Diagnosis:     Z00.00  Encounter for general adult medical examination without abnormal findings            Orders:      88840  Preventive medicine, established patient, age 18-39 years  (In-House)              Z13.31  Encounter for screening for depression           Orders:        Depression screen negative  (In-House)              D64.9  Anemia, unspecified     Z11.1  Encounter for screening for respiratory tuberculosis           Orders:      71944  PPD TB test  (In-House)    lot [id number]  exp 6/10/22  done R forearm 08/19/20 @ 1350  /mnp          F41.1  Generalized anxiety disorder     J45.20  Mild intermittent asthma, uncomplicated     E78.2  Mixed hyperlipidemia     G47.30  Sleep apnea, unspecified     J30.9  Allergic rhinitis, unspecified

## 2021-05-18 NOTE — PROGRESS NOTES
Katrina BahenaKeke 1985     Office/Outpatient Visit    Visit Date: Fri, Aug 31, 2018 03:11 pm    Provider: Suly Ramirez N.P. (Assistant: Sarah Spurling, MA)    Location: [facility]        Electronically signed by Suly Ramirez N.P. on  08/31/2018 10:12:28 PM                             SUBJECTIVE:        CC:     Ms. Bahena is a 33 year old White female.  This is a follow-up visit.          HPI: seen with richard lopez student         Ms. Bahena presents with anxiety, generalized.  Her anxiety disorder was originally diagnosed 4 years ago.  Current treatment includes Lexapro and BuSpar.  doing well.  denies side effects.  requests refills.      Feels hot all the time.     ROS:     CONSTITUTIONAL:  Negative for chills and fever.      CARDIOVASCULAR:  Negative for chest pain and palpitations.      RESPIRATORY:  Negative for recent cough and dyspnea.      GASTROINTESTINAL:  Negative for abdominal pain, nausea and vomiting.      MUSCULOSKELETAL:  Negative for arthralgias and myalgias.      NEUROLOGICAL:  Negative for paresthesias and weakness.      ENDOCRINE:  Positive for temperature intolerances ( heat intolerance ).   Negative for hot flashes.      PSYCHIATRIC:  Positive for anxiety.   Negative for depression.          PMH/FMH/SH:     Last Reviewed on 8/31/2018 10:11 PM by Suly Ramirez    Past Medical History:                 PAST MEDICAL HISTORY         Allergies: seasonal;     Anxiety         GYNECOLOGICAL HISTORY:    G0    No problems with menstrual cycles.    Menarche occurred at age 13.          PREVENTIVE HEALTH MAINTENANCE             PAP SMEAR: was last done 2016 with normal results Dr. Tika Santamaria         Surgical History:     NONE         Family History:     Father: Hypertension; Hyperlipidemia     Mother: gestational diabetes;  Osteoarthritis     Brother(s): Healthy; 5 brother(s) total     Sister(s): 4 sister(s) total;  Autism     Paternal Grandfather: Medical history unknown      Paternal Grandmother: Medical history unknown     Maternal Grandfather: Medical history unknown     Maternal Grandmother: Cerebrovascular Accident         Social History:     Occupation: Teacher at [employer];     Marital Status: Single     Children: None         Tobacco/Alcohol/Supplements:     Last Reviewed on 8/31/2018 03:19 PM by Spurling, Sarah C    Tobacco: She has never smoked.          Alcohol: Frequency: Socially     Caffeine:  She admits to consuming caffeine via tea ( 1 serving per day ).          Substance Abuse History:     Last Reviewed on 9/29/2017 10:37 AM by Annabelle Pitts        Mental Health History:     Last Reviewed on 9/29/2017 10:37 AM by Annabelle Pitts        Communicable Diseases (eg STDs):     Last Reviewed on 9/29/2017 10:37 AM by Annabelle Pitts            Current Problems:     Last Reviewed on 9/29/2017 10:37 AM by Annabelle Pitts    Screening for diabetes mellitus     Screening for cholesterol level     Anemia, unspecified     Unspecified anemia     Anxiety, generalized         Immunizations:     Td adult 5/12/2017     Merck 4 Valent HPV vaccine 5/30/2003     Merck 4 Valent HPV vaccine 7/30/2003     Merck 4 Valent HPV vaccine 12/8/2003     Havrix -adult dose (HepA) 5/23/2018     Fluzone (3 + years dose) 9/29/2017     PPD 1/5/2010     PPD 1/6/2011     PPD 9/29/2017     Adacel (Tdap) 1/6/2011         Allergies:     Last Reviewed on 2/14/2018 03:53 PM by Maia Mcclelland    Viibryd: shortness of breath        Current Medications:     Last Reviewed on 2/14/2018 03:53 PM by Maia Mcclelland    Lexapro 20mg Tablet 1 tab daily     Buspirone HCl 10mg Tablet 1 tab bid PRN     Ferrous Sulfate 325mg Tablets 1 TAB DAILY     Proventil HFA 90mcg/1actuation Oral Inhaler Inhale 2 puff(s) by mouth q 4 to 6 hr     Ogestrel 28 Tablet Take 1 tablet(s) by mouth daily as directed.     iron otc daily     Multivitamin/Mineral Supplement         OBJECTIVE:        Vitals:         Historical:     02/14/2018   BP:   119/74 mm Hg ( (left arm, , sitting, );)     09/29/2017  Wt:   246.5lbs        Current: 8/31/2018 3:21:20 PM    Ht:  5 ft, 6 in    T: 99 F (oral);  BP: 129/82 mm Hg (left arm, sitting);  P: 79 bpm (left arm (BP Cuff), sitting);  sCr: 0.84 mg/dL;  GFR: 95.57        Exams:     PHYSICAL EXAM:     GENERAL: vital signs recorded - well developed, well nourished;  no apparent distress;     NECK: range of motion is normal; thyroid is non-palpable;     RESPIRATORY: normal respiratory rate and pattern with no distress; normal breath sounds with no rales, rhonchi, wheezes or rubs;     CARDIOVASCULAR: normal rate; rhythm is regular;  no edema;     GASTROINTESTINAL: nontender; normal bowel sounds;     PSYCHIATRIC: appropriate affect and demeanor; normal psychomotor function;         ASSESSMENT           300.02   F41.1  Anxiety, generalized              DDx:     780.8   R61  Increased sweating              DDx:     V77.91   Z13.220  Screening for cholesterol level              DDx:         ORDERS:         Meds Prescribed:       Refill of: Lexapro (Escitalopram Oxalate) 20mg Tablet 1 tab daily  #30 (Thirty) tablet(s) Refills: 5       Refill of: Buspirone HCl 10mg Tablet 1 tab bid PRN  #180 (One [place]) tablet(s) Refills: 2         Lab Orders:       [id number]  [address] CBC w/o diff  (Send-Out)         84429  [facility] Comp. Metabolic Panel  (Send-Out)         82[facility]  [facility] (42716, 53691)  (Send-Out)         55717  [facility] Lipid Panel  (Send-Out)                   PLAN:          Anxiety, generalized           Prescriptions:       Refill of: Lexapro (Escitalopram Oxalate) 20mg Tablet 1 tab daily  #30 (Thirty) tablet(s) Refills: 5       Refill of: Buspirone HCl 10mg Tablet 1 tab bid PRN  #180 (One [place]) tablet(s) Refills: 2          Increased sweating     LABORATORY:  Labs ordered to be performed today include CBC W/O DIFF, Comprehensive metabolic panel, and Thyroid Panel.             Orders:       [id number]  [facility] - [facility] CBC w/o diff  (Send-Out)         81422  COMP - [facility] Comp. Metabolic Panel  (Send-Out)         34624  THYII - [facility] Thyroid panel with TSH (63063, 49022)  (Send-Out)            Screening for cholesterol level     LABORATORY:  Labs ordered to be performed today include lipid panel.            Orders:       [id number]  [facility] Lipid Panel  (Send-Out)               CHARGE CAPTURE           **Please note: ICD descriptions below are intended for billing purposes only and may not represent clinical diagnoses**        Primary Diagnosis:         300.02 Anxiety, generalized            F41.1    Generalized anxiety disorder              Orders:          57496   Office/outpatient visit; established patient, level 4  (In-House)           780.8 Increased sweating            R61    Generalized hyperhidrosis    V77.91 Screening for cholesterol level            Z13.220    Encounter for screening for lipoid disorders

## 2021-07-01 VITALS
HEIGHT: 66 IN | SYSTOLIC BLOOD PRESSURE: 127 MMHG | WEIGHT: 262.2 LBS | DIASTOLIC BLOOD PRESSURE: 94 MMHG | BODY MASS INDEX: 42.14 KG/M2 | HEART RATE: 87 BPM | TEMPERATURE: 98.5 F

## 2021-07-01 VITALS
WEIGHT: 246.5 LBS | TEMPERATURE: 99 F | HEART RATE: 79 BPM | BODY MASS INDEX: 39.62 KG/M2 | SYSTOLIC BLOOD PRESSURE: 129 MMHG | HEIGHT: 66 IN | DIASTOLIC BLOOD PRESSURE: 82 MMHG

## 2021-07-01 VITALS
BODY MASS INDEX: 41.98 KG/M2 | DIASTOLIC BLOOD PRESSURE: 84 MMHG | HEIGHT: 66 IN | WEIGHT: 261.2 LBS | HEART RATE: 107 BPM | TEMPERATURE: 99.7 F | SYSTOLIC BLOOD PRESSURE: 127 MMHG

## 2021-07-01 VITALS
BODY MASS INDEX: 38.76 KG/M2 | TEMPERATURE: 98.9 F | DIASTOLIC BLOOD PRESSURE: 74 MMHG | WEIGHT: 241.2 LBS | HEIGHT: 66 IN | HEART RATE: 67 BPM | SYSTOLIC BLOOD PRESSURE: 119 MMHG

## 2021-07-02 VITALS
WEIGHT: 271.2 LBS | BODY MASS INDEX: 43.58 KG/M2 | TEMPERATURE: 97.1 F | HEIGHT: 66 IN | SYSTOLIC BLOOD PRESSURE: 132 MMHG | DIASTOLIC BLOOD PRESSURE: 89 MMHG | HEART RATE: 81 BPM

## 2021-07-02 VITALS
HEART RATE: 78 BPM | DIASTOLIC BLOOD PRESSURE: 88 MMHG | WEIGHT: 274.4 LBS | SYSTOLIC BLOOD PRESSURE: 132 MMHG | BODY MASS INDEX: 44.1 KG/M2 | TEMPERATURE: 97.8 F | HEIGHT: 66 IN

## 2021-07-02 VITALS
HEIGHT: 66 IN | DIASTOLIC BLOOD PRESSURE: 85 MMHG | TEMPERATURE: 96.9 F | BODY MASS INDEX: 44.84 KG/M2 | WEIGHT: 279 LBS | SYSTOLIC BLOOD PRESSURE: 132 MMHG | HEART RATE: 76 BPM

## 2021-08-16 RX ORDER — BUSPIRONE HYDROCHLORIDE 10 MG/1
10 TABLET ORAL 2 TIMES DAILY PRN
Qty: 180 TABLET | Refills: 0 | Status: SHIPPED | OUTPATIENT
Start: 2021-08-16 | End: 2022-02-22 | Stop reason: SDUPTHER

## 2021-08-16 RX ORDER — BUSPIRONE HYDROCHLORIDE 10 MG/1
10 TABLET ORAL
COMMUNITY
End: 2021-08-16 | Stop reason: SDUPTHER

## 2021-08-16 NOTE — TELEPHONE ENCOUNTER
buspar 10mg -- no refills at [facility] to transfer, unsure of dose      Suly Ramirez patient

## 2021-10-19 ENCOUNTER — TELEPHONE (OUTPATIENT)
Dept: FAMILY MEDICINE CLINIC | Age: 36
End: 2021-10-19

## 2021-10-19 NOTE — TELEPHONE ENCOUNTER
Caller: Katrina Bahena    Relationship to patient: Self    Best call back number: [phone]     Chief complaint: HEART PALPITATIONS    Patient directed to call 911 or go to their nearest emergency room.     Patient verbalized understanding: [] Yes  [x] No  If no, why? PATIENT STATED SHE WOULD CALL HER MOM AND GO FROM THERE    Additional notes: PATIENT REFUSED HUB TO WARM TRANSFER TO OFFICE

## 2021-10-20 NOTE — TELEPHONE ENCOUNTER
Spoke to pt, she states that she went to [facility] and they did a bunch of tests and said they could tell she was having extra heart beats, but her cardiac enzymes were normal.  Faxed to get records and scheduled f/u appt

## 2021-10-29 ENCOUNTER — OFFICE VISIT (OUTPATIENT)
Dept: FAMILY MEDICINE CLINIC | Age: 36
End: 2021-10-29

## 2021-10-29 VITALS
WEIGHT: 264.6 LBS | SYSTOLIC BLOOD PRESSURE: 131 MMHG | DIASTOLIC BLOOD PRESSURE: 68 MMHG | TEMPERATURE: 98.5 F | HEART RATE: 87 BPM | HEIGHT: 66 IN | BODY MASS INDEX: 42.52 KG/M2

## 2021-10-29 DIAGNOSIS — R00.2 PALPITATIONS: Primary | ICD-10-CM

## 2021-10-29 PROCEDURE — 99214 OFFICE O/P EST MOD 30 MIN: CPT | Performed by: NURSE PRACTITIONER

## 2021-11-03 NOTE — ASSESSMENT & PLAN NOTE
Holter monitor is ordered.  Would also recommend an echocardiogram to rule out valve issues with the heart.  She is to avoid any and all caffeine and Sudafed.  Avoid energy drinks.  Consider referral to cardiology or use of a low-dose beta-blocker.  Follow-up after Holter monitor and echocardiogram.

## 2022-01-13 RX ORDER — ESCITALOPRAM OXALATE 20 MG/1
20 TABLET ORAL DAILY
Qty: 90 TABLET | Refills: 0 | Status: SHIPPED | OUTPATIENT
Start: 2022-01-13 | End: 2022-04-29 | Stop reason: SDUPTHER

## 2022-02-22 NOTE — TELEPHONE ENCOUNTER
Caller: Katrina Bahena ASHLYN    Relationship: Self    Best call back number: [phone]    Requested Prescriptions:   Requested Prescriptions     Pending Prescriptions Disp Refills   • escitalopram (LEXAPRO) 20 MG tablet 90 tablet 0     Sig: Take 1 tablet by mouth Daily.   • busPIRone (BUSPAR) 10 MG tablet 180 tablet 0     Sig: Take 1 tablet by mouth 2 (Two) Times a Day As Needed for anxiety        Pharmacy where request should be sent: [facility]     Additional details provided by patient: PATIENT HAS AN APPOINTMENT FOR MED. MANAGEMENT, HOWEVER SHE DOES NOT HAVE ENOUGH MEDICATION TO MAKE IT TILL Friday. DATE OF HER APPOINTMENT     Does the patient have less than a 3 day supply:  [x] Yes  [] No    Silvana LOREDO Rep   02/22/22 10:15 EST

## 2022-02-23 RX ORDER — BUSPIRONE HYDROCHLORIDE 10 MG/1
10 TABLET ORAL 2 TIMES DAILY PRN
Qty: 60 TABLET | Refills: 0 | Status: SHIPPED | OUTPATIENT
Start: 2022-02-23 | End: 2022-11-18 | Stop reason: SDUPTHER

## 2022-02-23 RX ORDER — ESCITALOPRAM OXALATE 20 MG/1
20 TABLET ORAL DAILY
Qty: 30 TABLET | Refills: 0 | Status: SHIPPED | OUTPATIENT
Start: 2022-02-23 | End: 2022-02-25 | Stop reason: SDUPTHER

## 2022-02-25 ENCOUNTER — LAB (OUTPATIENT)
Dept: LAB | Facility: HOSPITAL | Age: 37
End: 2022-02-25

## 2022-02-25 ENCOUNTER — OFFICE VISIT (OUTPATIENT)
Dept: FAMILY MEDICINE CLINIC | Age: 37
End: 2022-02-25

## 2022-02-25 VITALS
BODY MASS INDEX: 44.52 KG/M2 | SYSTOLIC BLOOD PRESSURE: 134 MMHG | OXYGEN SATURATION: 98 % | DIASTOLIC BLOOD PRESSURE: 90 MMHG | WEIGHT: 277 LBS | HEIGHT: 66 IN | HEART RATE: 82 BPM

## 2022-02-25 DIAGNOSIS — R00.0 TACHYCARDIA: ICD-10-CM

## 2022-02-25 DIAGNOSIS — D64.9 ANEMIA, UNSPECIFIED TYPE: ICD-10-CM

## 2022-02-25 DIAGNOSIS — E55.9 VITAMIN D DEFICIENCY: ICD-10-CM

## 2022-02-25 DIAGNOSIS — E78.2 MIXED HYPERLIPIDEMIA: ICD-10-CM

## 2022-02-25 DIAGNOSIS — F41.9 ANXIETY: ICD-10-CM

## 2022-02-25 DIAGNOSIS — R53.83 OTHER FATIGUE: ICD-10-CM

## 2022-02-25 DIAGNOSIS — R73.03 PREDIABETES: ICD-10-CM

## 2022-02-25 DIAGNOSIS — R73.03 PREDIABETES: Primary | ICD-10-CM

## 2022-02-25 DIAGNOSIS — G47.30 SLEEP APNEA, UNSPECIFIED TYPE: ICD-10-CM

## 2022-02-25 LAB
BASOPHILS # BLD AUTO: 0.03 10*3/MM3 (ref 0–0.2)
BASOPHILS NFR BLD AUTO: 0.4 % (ref 0–1.5)
DEPRECATED RDW RBC AUTO: 38.5 FL (ref 37–54)
EOSINOPHIL # BLD AUTO: 0.17 10*3/MM3 (ref 0–0.4)
EOSINOPHIL NFR BLD AUTO: 2.5 % (ref 0.3–6.2)
ERYTHROCYTE [DISTWIDTH] IN BLOOD BY AUTOMATED COUNT: 13.4 % (ref 12.3–15.4)
HCT VFR BLD AUTO: 38.9 % (ref 34–46.6)
HGB BLD-MCNC: 13.2 G/DL (ref 12–15.9)
IMM GRANULOCYTES # BLD AUTO: 0.04 10*3/MM3 (ref 0–0.05)
IMM GRANULOCYTES NFR BLD AUTO: 0.6 % (ref 0–0.5)
LYMPHOCYTES # BLD AUTO: 1.9 10*3/MM3 (ref 0.7–3.1)
LYMPHOCYTES NFR BLD AUTO: 27.7 % (ref 19.6–45.3)
MCH RBC QN AUTO: 27.7 PG (ref 26.6–33)
MCHC RBC AUTO-ENTMCNC: 33.9 G/DL (ref 31.5–35.7)
MCV RBC AUTO: 81.6 FL (ref 79–97)
MONOCYTES # BLD AUTO: 0.41 10*3/MM3 (ref 0.1–0.9)
MONOCYTES NFR BLD AUTO: 6 % (ref 5–12)
NEUTROPHILS NFR BLD AUTO: 4.3 10*3/MM3 (ref 1.7–7)
NEUTROPHILS NFR BLD AUTO: 62.8 % (ref 42.7–76)
NRBC BLD AUTO-RTO: 0 /100 WBC (ref 0–0.2)
PLATELET # BLD AUTO: 289 10*3/MM3 (ref 140–450)
PMV BLD AUTO: 10.9 FL (ref 6–12)
RBC # BLD AUTO: 4.77 10*6/MM3 (ref 3.77–5.28)
WBC NRBC COR # BLD: 6.85 10*3/MM3 (ref 3.4–10.8)

## 2022-02-25 PROCEDURE — 99214 OFFICE O/P EST MOD 30 MIN: CPT | Performed by: NURSE PRACTITIONER

## 2022-02-25 PROCEDURE — 36415 COLL VENOUS BLD VENIPUNCTURE: CPT

## 2022-02-25 PROCEDURE — 80061 LIPID PANEL: CPT

## 2022-02-25 PROCEDURE — 82607 VITAMIN B-12: CPT

## 2022-02-25 PROCEDURE — 85025 COMPLETE CBC W/AUTO DIFF WBC: CPT

## 2022-02-25 PROCEDURE — 84466 ASSAY OF TRANSFERRIN: CPT

## 2022-02-25 PROCEDURE — 82306 VITAMIN D 25 HYDROXY: CPT

## 2022-02-25 PROCEDURE — 83540 ASSAY OF IRON: CPT

## 2022-02-25 PROCEDURE — 83036 HEMOGLOBIN GLYCOSYLATED A1C: CPT

## 2022-02-25 PROCEDURE — 80053 COMPREHEN METABOLIC PANEL: CPT

## 2022-02-25 RX ORDER — FERROUS SULFATE 325(65) MG
325 TABLET ORAL
COMMUNITY

## 2022-02-25 RX ORDER — METOPROLOL SUCCINATE 25 MG/1
12.5 TABLET, EXTENDED RELEASE ORAL DAILY
Qty: 45 TABLET | Refills: 1 | Status: SHIPPED | OUTPATIENT
Start: 2022-02-25 | End: 2022-09-27 | Stop reason: SDUPTHER

## 2022-02-25 RX ORDER — ESCITALOPRAM OXALATE 20 MG/1
20 TABLET ORAL DAILY
Qty: 90 TABLET | Refills: 1 | Status: SHIPPED | OUTPATIENT
Start: 2022-02-25 | End: 2022-11-18 | Stop reason: SDUPTHER

## 2022-02-25 RX ORDER — BUSPIRONE HYDROCHLORIDE 10 MG/1
10 TABLET ORAL 2 TIMES DAILY PRN
Qty: 180 TABLET | Refills: 1 | Status: SHIPPED | OUTPATIENT
Start: 2022-02-25 | End: 2022-11-18 | Stop reason: SDUPTHER

## 2022-02-25 NOTE — PROGRESS NOTES
"Chief Complaint  Anxiety (follow up - med managment ) and Hyperlipidemia    Subjective  Patient is 36-year-old female who recently had a Holter monitor due to palpitations.  It did note some sinus tachycardia with no other abnormality.  We scheduled her for an echocardiogram..  She states it needed to be rescheduled and she has not had the number to call and get it rescheduled.  Wants to proceed with echocardiogram.  Is also agreeable to starting a low-dose beta-blocker.  She denies chest pain.    Was seen a weight loss center for treatment of prediabetes with Metformin.  She can no longer afford to continue seeing them.  She would like to continue to take Metformin 500 mg once daily.  And get updated lab work.  She is noting fatigue.  Sleep at night is without difficulty.    Sleep apnea was diagnosed at [facility] sleep [place].  Family allergy and asthma referred her to [facility] sleep [place].  She needs a new mask.  She is unable to follow-up with family allergy and asthma due to a peel.  Requests an order to get a new mask for her CPAP machine.    Anxiety is stable on Lexapro 20 mg once daily and buspirone 10 mg twice daily as needed.  Denies side effects and requests refills.        Katrina Bahena presents to [facility]    Review of Systems   Constitutional: Positive for fatigue. Negative for chills and fever.   HENT: Negative.    Respiratory: Negative.    Cardiovascular: Negative.    Musculoskeletal: Negative.         Objective   Vital Signs:   Vitals:    02/25/22 1524 02/25/22 1557   BP: 136/97 134/90   BP Location: Left arm Right arm   Patient Position: Sitting Sitting   Cuff Size: Large Adult Large Adult   Pulse: 82    SpO2: 98%    Weight: 126 kg (277 lb)    Height: 167.6 cm (66\")       Physical Exam  Vitals reviewed.   Constitutional:       General: She is not in acute distress.     Appearance: Normal appearance. She is well-developed. She is obese.   Neck:      Thyroid: No thyroid " mass, thyromegaly or thyroid tenderness.   Cardiovascular:      Rate and Rhythm: Normal rate and regular rhythm.      Pulses:           Posterior tibial pulses are 2+ on the right side and 2+ on the left side.      Heart sounds: Normal heart sounds.   Pulmonary:      Effort: Pulmonary effort is normal.      Breath sounds: Normal breath sounds.   Musculoskeletal:      Right lower leg: No edema.      Left lower leg: No edema.   Skin:     General: Skin is warm and dry.   Neurological:      General: No focal deficit present.      Mental Status: She is alert.   Psychiatric:         Attention and Perception: Attention normal.         Mood and Affect: Mood and affect normal.         Behavior: Behavior normal.          Result Review :     CMP    CMP 6/1/21 2/25/22   Glucose 127 (A) 94   BUN 9 13   Creatinine 0.95 (A) 0.98   eGFR Non African Am  65   Sodium 137 139   Potassium 3.6 4.2   Chloride 100 101   Calcium 9.0 9.5   Albumin  4.20   Total Bilirubin  0.2   Alkaline Phosphatase  60   AST (SGOT)  19   ALT (SGPT)  22   (A) Abnormal value            CBC    CBC 4/10/21 6/1/21 2/25/22   WBC 5.17 8.57 6.85   RBC 4.95 4.91 4.77   Hemoglobin 13.90 13.7 13.2   Hematocrit 42.5 40.6 38.9   MCV 85.9 82.7 81.6   MCH 28.1 27.9 27.7   MCHC 32.7 (A) 33.7 33.9   RDW 12.4 12.6 13.4   Platelets 281.00 251 289   (A) Abnormal value            CBC w/diff    CBC w/Diff 4/10/21 6/1/21   WBC 5.17 8.57   RBC 4.95 4.91   Hemoglobin 13.90 13.7   Hematocrit 42.5 40.6   MCV 85.9 82.7   MCH 28.1 27.9   MCHC 32.7 (A) 33.7   RDW 12.4 12.6   Platelets 281.00 251   Neutrophil Rel %  60.6   Lymphocyte Rel %  30.2   Monocyte Rel %  5.8   Eosinophil Rel %  2.5   Basophil Rel %  0.5   (A) Abnormal value            Lipid Panel    Lipid Panel 4/10/21 2/25/22   Total Cholesterol  250 (A)   Total Cholesterol 266 (A)    Triglycerides 258 (A) 327 (A)   HDL Cholesterol 49 49   VLDL Cholesterol 52 (A) 59 (A)   LDL Cholesterol  165 (A) 142 (A)   LDL/HDL Ratio  2.77    (A) Abnormal value       Comments are available for some flowsheets but are not being displayed.                        Assessment and Plan    Diagnoses and all orders for this visit:    1. Prediabetes (Primary)  Assessment & Plan:  Wants to continue Metformin.  Further treatment pending lab results.    Orders:  -     Hemoglobin A1c; Future  -     metFORMIN (GLUCOPHAGE) 500 MG tablet; Take 1 tablet by mouth Daily.  Dispense: 90 tablet; Refill: 1    2. Vitamin D deficiency  -     Vitamin D 25 hydroxy; Future    3. Other fatigue  -     Vitamin B12; Future    4. Sleep apnea, unspecified type  Assessment & Plan:  Need to obtain records from her sleep study.  I am okay with ordering her a mask for her CPAP machine.  Written order for CPAP machine mask to be provided.      5. Mixed hyperlipidemia  -     Comprehensive metabolic panel; Future  -     Lipid panel; Future    6. Anemia, unspecified type  -     CBC w AUTO Differential; Future  -     Iron and TIBC; Future    7. Anxiety  -     escitalopram (LEXAPRO) 20 MG tablet; Take 1 tablet by mouth Daily.  Dispense: 90 tablet; Refill: 1  -     busPIRone (BUSPAR) 10 MG tablet; Take 1 tablet by mouth 2 (Two) Times a Day As Needed for anxiety  Dispense: 180 tablet; Refill: 1    8. Tachycardia  -     metoprolol succinate XL (Toprol XL) 25 MG 24 hr tablet; Take 1/2 tablet by mouth Daily.  Dispense: 45 tablet; Refill: 1      Follow Up    No follow-ups on file.  Patient was given instructions and counseling regarding her condition or for health maintenance advice. Please see specific information pulled into the AVS if appropriate.

## 2022-02-26 LAB
25(OH)D3 SERPL-MCNC: 24.3 NG/ML
ALBUMIN SERPL-MCNC: 4.2 G/DL (ref 3.5–5.2)
ALBUMIN/GLOB SERPL: 1.4 G/DL
ALP SERPL-CCNC: 60 U/L (ref 39–117)
ALT SERPL W P-5'-P-CCNC: 22 U/L (ref 1–33)
ANION GAP SERPL CALCULATED.3IONS-SCNC: 11.6 MMOL/L (ref 5–15)
AST SERPL-CCNC: 19 U/L (ref 1–32)
BILIRUB SERPL-MCNC: 0.2 MG/DL (ref 0–1.2)
BUN SERPL-MCNC: 13 MG/DL (ref 6–20)
BUN/CREAT SERPL: 13.3 (ref 7–25)
CALCIUM SPEC-SCNC: 9.5 MG/DL (ref 8.6–10.5)
CHLORIDE SERPL-SCNC: 101 MMOL/L (ref 98–107)
CHOLEST SERPL-MCNC: 250 MG/DL (ref 0–200)
CO2 SERPL-SCNC: 26.4 MMOL/L (ref 22–29)
CREAT SERPL-MCNC: 0.98 MG/DL (ref 0.57–1)
GFR SERPL CREATININE-BSD FRML MDRD: 65 ML/MIN/1.73
GLOBULIN UR ELPH-MCNC: 3 GM/DL
GLUCOSE SERPL-MCNC: 94 MG/DL (ref 65–99)
HBA1C MFR BLD: 5.3 % (ref 4.8–5.6)
HDLC SERPL-MCNC: 49 MG/DL (ref 40–60)
IRON 24H UR-MRATE: 67 MCG/DL (ref 37–145)
IRON SATN MFR SERPL: 14 % (ref 20–50)
LDLC SERPL CALC-MCNC: 142 MG/DL (ref 0–100)
LDLC/HDLC SERPL: 2.77 {RATIO}
POTASSIUM SERPL-SCNC: 4.2 MMOL/L (ref 3.5–5.2)
PROT SERPL-MCNC: 7.2 G/DL (ref 6–8.5)
SODIUM SERPL-SCNC: 139 MMOL/L (ref 136–145)
TIBC SERPL-MCNC: 468 MCG/DL (ref 298–536)
TRANSFERRIN SERPL-MCNC: 314 MG/DL (ref 200–360)
TRIGL SERPL-MCNC: 327 MG/DL (ref 0–150)
VIT B12 BLD-MCNC: 338 PG/ML (ref 211–946)
VLDLC SERPL-MCNC: 59 MG/DL (ref 5–40)

## 2022-02-28 NOTE — ASSESSMENT & PLAN NOTE
Need to obtain records from her sleep study.  I am okay with ordering her a mask for her CPAP machine.  Written order for CPAP machine mask to be provided.

## 2022-02-28 NOTE — ASSESSMENT & PLAN NOTE
I can order an echocardiogram for further evaluation.  She is agreeable to trying a low-dose beta-blocker to help manage her symptoms.  Consider referral to cardiology.  Follow-up if not improving.

## 2022-03-07 ENCOUNTER — E-VISIT (OUTPATIENT)
Dept: FAMILY MEDICINE CLINIC | Facility: TELEHEALTH | Age: 37
End: 2022-03-07

## 2022-03-07 PROCEDURE — BRIGHTMDVISIT: Performed by: NURSE PRACTITIONER

## 2022-03-07 NOTE — EXTERNAL PATIENT INSTRUCTIONS
"   View Doctor's Note     Diagnosis   Recurrent cold sores (herpes labialis)   My name is Samantha Venegas, and I'm a healthcare provider at [facility]. I'm sorry to hear about your discomfort. I've reviewed your photos and responses. Your symptoms suggest you have cold sores. Cold sores usually go away on their own in a week or two.   I've given you a doctor's note for 1 day.   Medications   Your pharmacy   [facility] [place] [address] [address] [facility] [facility] [address] [phone]     Prescription      Valacyclovir oral tablet (1000mg): Take 2 tablets by mouth every 12 hours for 1 day, until the full prescribed amount is finished. Drink plenty of water while taking this medication.   About your diagnosis   Cold sores are small, fluid-filled blisters that develop on or around the lips. They're also commonly known as \"fever blisters.\" Other areas around the mouth can be affected, including under the nose and just above the chin. Cold sores often come grouped together in patches. As the blisters break open, the shallow, open sores will ooze and then crust over.   Cold sores are caused by the herpes simplex virus (HSV). Cold sores are contagious. The virus is easily spread through contact with the cold sore itself or the fluid in the blisters. Kissing, sharing utensils, or sharing a razor can all spread the virus.   There are two types of herpes simplex virus: HSV-1 and HSV-2. Both types of HSV virus can cause sores around the mouth (cold sores) and on the genitals (genital herpes). For this reason, herpes can be spread through oral sex.   Cold sores tend to come back in the same place. The first time you get one, you may have a fever, swollen lymph nodes, a sore throat, or a headache. These symptoms usually don't happen again when cold sores return. Pain and blisters are usually less severe with repeat outbreaks. Some people can tell when their cold sores are about to come back because " "they feel tingling, pain, or burning in the same spot. This is known as the \"prodrome.\"   Common triggers of cold sores include:    Increased stress or a life change    Extended sun exposure    A recent viral infection, such as a cold    Hormonal changes, such as before or during your menstrual period    Dental work   Common signs and symptoms of cold sores include:    Pain or tenderness at the affected area.    Fluid-filled blisters.    Lesions above or below the lips or at the corners of the mouth. These are the most common areas affected by cold sores.    Having 1 to 5 sores. Cold sore outbreaks usually involve fewer than 5 sores.    A bumpy surface on the affected area.    A previous history of cold sores.   What to expect   There is no cure for cold sores, and you'll likely have flares throughout your lifetime. However, you can manage your symptoms with this treatment plan. Even without treatment, cold sores aren't dangerous and most go away on their own within 7 to 14 days.   When to seek care   Call us at [phone]   with any sudden or unexpected symptoms.    Sores lasting longer than 2 weeks.    Sores that often return.    Sores that become unbearably painful.    Extreme difficulty eating or drinking.    A fever higher than 103F or lasting longer than 4 days.   Other treatment   There are many home remedies for cold sores. However, the evidence is mixed on whether they help healing. Lysine, propolis (synthetic beeswax), and a cream combining rhubarb and edelmira may help soothe the sores and shorten healing time. At the very least, these remedies won't cause harm.   Putting an ice cube on the cold sores may also help reduce pain and swelling.   Prevention   Since cold sores may be triggered by stress, think about changes you can make to manage stress. Many people find regular exercise, meditation, and yoga helpful for managing stress.   While you have cold sores, avoid kissing and sharing utensils, cups, " lip balm, or razors. If you're sexually active, avoid oral sex while you have cold sores.   The virus can spread even when you don't have any sores. However, it spreads most easily when you have moist, active sores.   Your provider   Your diagnosis was provided by Samantha Venegas, a member of your trusted care team at [facility].   If you have any questions, call us at [phone]  .   View Doctor's Note     Expires on 04/06/22

## 2022-03-07 NOTE — E-VISIT TREATED
Chief Complaint: Mouth sores   Patient introduction   Patient is 36-year-old female who reports swelling, sores or blisters, and redness on or below their lower lip. Reports tightness or discomfort when opening their mouth wide. Number of lesions: 2-4.   Symptoms have been present for 1-3 days.   Warning. The following may warrant further investigation:    Reports sore throat   Patient-submitted comments   Patient writes: I also have a sinus infection if I can get antibiotics for that as well..   Patient requests a 1-day excuse note.   General presentation   Denies dysphagia.   Denies fever.   Diagnosed with iron deficiency. Patient is taking supplements or receiving treatment for their deficiency.   Reports surface sensations, including pruritus and pain/tenderness. Denies condition interferes with talking, eating, or drinking.   Denies facial edema. Denies swollen lymph nodes.   Reports tingling/pain/burning sensation in same location prior to symptom onset. Reports recent menstruation and viral infection or fever prior to symptom onset. Reports previous sores appeared in same location as current sore.   Denies wearing dentures.   Reports similar mouth symptoms in the past, with 1 instance of similar symptoms in the past 1 year.   Reports previous oral diagnosis of HSV. Reports using valacyclovir for cold sores in the past.   Denies the following red flags:    Premalignant mouth lesions    Mouth cancer    Unexplained sores or rash-like blisters on other parts of the body    Celiac disease    Neutropenia    Inflammatory bowel disease    Lupus    Behcet's syndrome    Delmi's syndrome   Pregnancy/menstrual status/breastfeeding:   Denies being pregnant. Denies breastfeeding. Regarding last menstrual period, patient writes: Currently on my period now..   Patient denies tobacco use. Denies alcohol consumption.   Current medications   Denies taking non-prescription acetaminophen, clotrimazole, ibuprofen, miconazole,  topical benzocaine, or zinc oxide.   Reports taking ferrous sulfate 325 MG [Ironmar], Escitalopram, Buspirone, and Apo-Metoprolol.   Medication allergies   None reported.   Medication contraindication review   Denies history of aspirin triad, NSAID-induced asthma/urticaria, or CABG surgery.   Past medical history   Immune conditions: Denies immunocompromising conditions. Denies history of cancer.   Assessment   Recurrent herpes labialis.   Plan   Medications:    valacyclovir 1 gram tablet RX 1000mg 2 tabs PO q12h 1d until the full prescribed amount is finished. Drink plenty of water while taking this medication. Amount is 4 tab.   The patient's prescription will be sent to:   [facility] - [place] [address] [address] [facility]. [address] [facility] [address] [address]   Phone: [phone]     Fax: [phone]   Other:   Patient was given an excuse note for 1 day.   Education:    Condition and causes    Prevention    Treatment and self-care    When to call provider   Additional Notes   oral lesion      ----------   Electronically signed by LIBRADO Plummer on 2022-03-07 at 06:53AM   ----------   Patient Interview Transcript:   Are your symptoms on the inside or the outside of your mouth? - Inside includes your tongue, inner cheek or lips, gums, or roof of mouth - Outside includes your lips, skin surrounding lips, or corners of mouth Select all that apply.    Outside   Not selected:    Inside   Which areas outside your mouth are affected? Select all that apply.    On or just below my lower lip   Not selected:    On or just above my upper lip    The corners of my mouth   Which of these best describe the symptoms outside your mouth? Select all that apply.    Swelling    Sores or blisters    Redness   Not selected:    Cracking    Scaling or flaking skin    Darkening of the skin   How long have you had these symptoms? Select one.    1 to 3 days   Not selected:    4 to 6 days    7 to 10 days    More than 10 days   Do  you have any of these in the affected area of your mouth? Select all that apply.    Itching    Pain or tenderness   Not selected:    Burning    A cottony feeling    Loss of taste    None of the above   How many mouth lesions, sores, or bumps do you have? Select one.    2 to 4   Not selected:    1    5 to 10    More than 10   Mouth sores can interfere with talking, eating, or drinking. How much does the mouth sore affect your ability to do these things? Select one.    Not at all   Not selected:    Somewhat, but I can still talk, eat, and drink    It makes talking, eating, or drinking impossible   Before your symptoms began, did you feel any tingling, pain, or burning in the area? Select one.    Yes   Not selected:    No    I'm not sure   We need to see photos of the affected area of your mouth. Photos help us recommend the best treatment for you. If you choose not to upload photos, you may need to speak to a provider to get care. Select one.    OK, I'll upload photos from my computer   Not selected:    No, I'd rather not upload photos   Send up to three photos for the provider to review: - At least one close-up of the affected area. - One photo showing the location in or around your mouth. - One showing your entire face (if you have swelling, redness, or warmth on any part of your face)    Upload 1    Upload 2   Not selected:    Upload 3   Can you swallow liquids and solid food? Select one.    Yes, with ease   Not selected:    Yes, but it's a bit painful    Yes, but I have severe pain in my chest when I swallow    It's very hard to swallow; it feels like liquids and food get stuck in my throat    No, I can't swallow anything   Is there any swelling, redness, or warmth on your cheek or any other part of your face (not including the mouth area)? Select one.    No   Not selected:    Yes    I'm not sure   Have you had tightness or discomfort when opening your mouth wide? Select one.    Yes   Not selected:    No   Do you  have any sores (or rash-like blisters) on other parts of your body that can't be explained by another condition? Scroll to see all options. Select all that apply.    No   Not selected:    Chin    Nose    Cheeks    Forehead    Arms, legs, or upper body    Palms of hands    Soles of feet    Genitals    I'm not sure   Do you have any of these symptoms? Select all that apply.    Sore throat   Not selected:    Chills    Loss of appetite    Headache    Muscle aches    None of the above   Have you had a fever along with your mouth symptoms? Select one.    No   Not selected:    Yes    I'm not sure   Are your glands/lymph nodes swollen or painful to the touch?    No   Not selected:    Yes    I'm not sure   Just before your symptoms began, did any of these apply to you? Select all that apply.    Onset of menstrual period    Fever or viral infection (such as cold or flu)   Not selected:    Dental work    Extended sun exposure (or sunburn)    Increased stress or major life change    None of the above   Do you wear dentures? Select one.    No   Not selected:    Yes   Have you had these symptoms before? Select one.    Yes   Not selected:    No    I'm not sure   How many times in the past year have you had these symptoms? Select one.    Once   Not selected:    Zero    Twice    3 times    More than 3 times    I'm not sure   When you had these symptoms before, what was the diagnosis? Scroll to see all options. Select all that apply.    Cold sores (oral herpes simplex virus)   Not selected:    Aphthous ulcers (canker sores)    Thrush    Mucocele    Angular cheilitis    I don't remember the diagnosis    I got a diagnosis, but it wasn't any of these listed here    I saw a healthcare provider, but no diagnosis was made    I didn't go to a healthcare provider   When you had cold sores in the past, were they in the same place as they are now? Select one.    Yes   Not selected:    No    I'm not sure   In the last 3 months, have either of  these applied to you? Select all that apply.    Neither of the above   Not selected:    Weight loss of 10 pounds or more without trying to lose weight    Severe fatigue or tiredness that doesn't improve with rest   In the last 3 months, have you had any of these symptoms that couldn't be explained by another condition? Select all that apply.    None of the above   Not selected:    Repeated episodes of diarrhea or bloody stools    Eye symptoms (pain, redness, discharge, sensitivity to light, or vision changes)    Urinary symptoms (burning with urination, blood in the urine, or frequent urination)    Joint symptoms (pain, redness, warmth, or swelling)   Have you ever been diagnosed with any of these conditions? Scroll to see all options. Select all that apply.    Nutritional deficiency (vitamin B-12, zinc, folic acid, iron deficiency, or malnutrition)   Not selected:    Behcet's syndrome    Celiac disease    Diabetes    Mouth cancer, such as squamous cell cancer or melanoma    Neutropenia    Premalignant mouth lesions (leukoplakia, erythroplakia)    Reactive arthritis (Delmi's syndrome)    None of the above    I'm not sure   What nutritional deficiency were you diagnosed with? Select all that apply.    Iron   Not selected:    Vitamin B-12    Zinc    Folate (folic acid)    Other (specify)   Have you had any of these conditions? Select all that apply.    None of the above   Not selected:    Aspirin triad (Samter's triad, or aspirin-sensitive asthma)    History of asthma or hives caused by NSAID drugs (aspirin, ibuprofen, or naproxen)    Coronary artery bypass graft (CABG) surgery    I'm not sure   Do you have any of these conditions that can affect the immune system? Scroll to see all options. Select all that apply.    None of these   Not selected:    History of bone marrow transplant    Chronic kidney disease    Chronic liver disease (including cirrhosis)    HIV/AIDS    Inflammatory bowel disease (Crohn's disease or  ulcerative colitis)    Lupus    Moderate to severe plaque psoriasis    Multiple sclerosis    Rheumatoid arthritis    Sickle cell anemia    Alpha or beta thalassemia    History of solid organ transplant (kidney, liver, or heart)    History of spleen removal    An autoimmune disorder not listed here    A condition requiring treatment with long-term use of oral steroids (such as prednisone, prednisolone, or dexamethasone)   Have you ever been diagnosed with cancer? Select one.    No   Not selected:    Yes, I have cancer now    Yes, but I'm in remission   Are you pregnant? Select one.    No   Not selected:    Yes   When was your last menstrual period? If you don't currently have periods or no longer have periods, please briefly explain.    Currently on my period now.   Are you breastfeeding? Select one.    No   Not selected:    Yes   Some habits can put you at risk for more serious mouth conditions. Do you use any tobacco products? This includes smoking, vaping, snorting, or chewing tobacco. Select one.    No, never   Not selected:    Yes, every day    Yes, some days    No, I quit   Do you drink alcohol? Select one.    No   Not selected:    1 drink or less per week    2 to 5 drinks per week    1 to 2 drinks per day    More than 3 drinks per day   Which of these antiviral medications have you used for cold sores in the past? Select all that apply.    Valacyclovir (Valtrex)   Not selected:    Acyclovir (Zovirax)    Famciclovir (Famvir)    None of the above   The next set of questions is about medications and medication-related allergies. Have you used any of these non-prescription medications for your current symptoms? Scroll to see all options. Select all that apply.    None of the above   Not selected:    Acetaminophen (Tylenol)    Benzocaine topical gel (Anbesol, Zilactin-B)    Clotrimazole (Lotrimin, Mycelex)    Ibuprofen (Advil, Motrin)    Miconazole (Phytoplex)    Zinc oxide   Are you currently taking supplements or  getting treatment for your nutritional deficiency? Select one.    Yes   Not selected:    No   Are you taking any other medications or supplements? On the next screen, you need to list all vitamins, supplements, non-prescription medications (such as aspirin or Aleve), and prescription medications that you're taking. Select one.    Yes   Not selected:    Yes, but I'm not sure what they are    No   Have you ever had an allergic or bad reaction to any medication? Select one.    Yes   Not selected:    No   Have you had an allergic or bad reaction to any of these medications? Select all that apply.    None of the above   Not selected:    Corticosteroid cream or paste, such as triamcinolone (Oralone), clobetasol (Cormax), or hydrocortisone    Antiviral drugs, such as acyclovir (Zovirax), valacyclovir (Valtrex), or penciclovir    Oral antifungal drugs, such as clotrimazole (Lotrimin, Mycelex), fluconazole (Diflucan), or nystatin (Bio-Statin)    Topical antifungal drugs, such as miconazole (Micatin, Tetterine)    Topical antibiotics, such as Mupirocin (Bactroban)    I'm not sure   Have you had an allergic or bad reaction to any of these non-prescription medications? Select all that apply.    None of the above   Not selected:    Acetaminophen (Tylenol)    Aspirin    Benzocaine (Anbesol, Zilactin-B)    Ibuprofen (Advil, Motrin)    Petroleum jelly (Vaseline)    Zinc oxide   Do you need a doctor's note? A doctor's note confirms that you received care today and states when you can return to school or work. It does not contain information about your diagnosis or treatment plan. Your provider will make the final decision on whether to give you a doctor's note. Doctor's notes CANNOT be backdated. Select one.    Today only (1 day)   Not selected:    Today and tomorrow (2 days)    3 days    No   Is there anything else you'd like to tell us about your symptoms?    I also have a sinus infection if I can get antibiotics for that as  well.   ----------   Medical history   The following information was received from the EMR on March 07, 2022.   Allergies:    No Known Allergies   - Allergy Type:   - Reaction:   - Severity:   - Status: active   Medications:    BUSPIRONE HCL 10 MG PO TABS   - Route: Oral   - Start Date: February 25, 2022   - End Date: None   - Status: active    ALBUTEROL SULFATE  (90 BASE) MCG/ACT IN AERS   - Route: Inhalation   - Start Date: October 19, 2021   - End Date: None   - Status: active    FERROUS SULFATE 325 (65 FE) MG PO TABS   - Route: Oral   - Start Date: February 25, 2022   - End Date: None   - Status: active    ESCITALOPRAM OXALATE 20 MG PO TABS   - Route: Oral   - Start Date: January 13, 2022   - End Date: None   - Status: active    ACCU-CHEK GUIDE W/DEVICE KIT   - Route:   - Start Date: July 15, 2021   - End Date: None   - Status: active    DICYCLOMINE HCL 20 MG PO TABS   - Route: Oral   - Start Date: May 31, 2021   - End Date: None   - Status: active    ACCU-CHEK SOFTCLIX LANCETS MISC   - Route:   - Start Date: July 15, 2021   - End Date: None   - Status: active    ESCITALOPRAM OXALATE 20 MG PO TABS   - Route: Oral   - Start Date: February 25, 2022   - End Date: None   - Status: active    ACCU-CHEK GUIDE VI STRP   - Route:   - Start Date: July 05, 2021   - End Date: None   - Status: active    METFORMIN  MG PO TABS   - Route: Oral   - Start Date: February 25, 2022   - End Date: None   - Status: active    ONDANSETRON 4 MG PO TBDP   - Route: Sublingual   - Start Date: May 31, 2021   - End Date: None   - Status: active    BUSPIRONE HCL 10 MG PO TABS   - Route: Oral   - Start Date: February 23, 2022   - End Date: None   - Status: active    METOPROLOL SUCCINATE ER 25 MG PO TB24   - Route: Oral   - Start Date: February 25, 2022   - End Date: None   - Status: active   Problem list:    Palpitations   - Category: Problem List Item   - Health Status:   - Start Date: October 29, 2021   - End Date: None    - Status: active    Prediabetes   - Category: Problem List Item   - Health Status:   - Start Date: February 25, 2022   - End Date: None   - Status: active    Vitamin D deficiency   - Category: Problem List Item   - Health Status:   - Start Date: February 25, 2022   - End Date: None   - Status: active    Other fatigue   - Category: Problem List Item   - Health Status:   - Start Date: February 25, 2022   - End Date: None   - Status: active    Sleep apnea   - Category: Problem List Item   - Health Status:   - Start Date: February 25, 2022   - End Date: None   - Status: active    Mixed hyperlipidemia   - Category: Problem List Item   - Health Status:   - Start Date: February 25, 2022   - End Date: None   - Status: active    Anemia   - Category: Problem List Item   - Health Status:   - Start Date: February 25, 2022   - End Date: None   - Status: active    Anxiety   - Category: Problem List Item   - Health Status:   - Start Date: February 25, 2022   - End Date: None   - Status: active    Tachycardia   - Category: Problem List Item   - Health Status:   - Start Date: February 25, 2022   - End Date: None   - Status: active

## 2022-03-07 NOTE — E-VISIT ESCALATED
Patient escalated   Provider Idania Huntley chose to escalate patient to another level of care because: Needs followup for possible COVID-19   Patient was sent the following message:   Based on the information you've provided us, you need to take another step to get care.   What to do now:    Please set up a video visit  .   You won't be charged for your eVisit. If you paid with a credit card, the charge will be reversed.   Chief Complaint: Coronavirus (COVID-19), cold, sinus pain, allergy, or flu   Patient introduction   Patient is 36-year-old female who reports cough, congestion, rhinitis, itchy or watery eyes, itchy nose or sneezing, sore throat, and voice hoarseness that started 3-5 days ago.   When asked why they're seeking care online today, patient reports they want to know if they have a cold or something more serious.   Patient has not requested COVID testing.   COVID-19 exposure, testing history, and vaccination status:    No known exposure to a confirmed or suspected case of COVID-19.    No recent travel outside of their local community.    Patient had a self-test on 3/6/2022. Test result was negative.    Reports receiving 3 doses of the COVID-19 vaccine (Moderna, Moderna, Moderna). Received their most recent dose of the vaccine > 14 days ago.   Risk factors for severe disease from COVID-19 infection:    BMI >= 40    Asthma   Patient requests a 1-day excuse note.   General presentation   Symptoms came on gradually.   Fever:    Denies fever.   Sinus and nasal symptoms:    Reports rhinitis.    Reports itchy nose or sneezing.    Reports green nasal drainage.    Nasal drainage is thin.    Reports postnasal drip.    Reports congestion with sinus pain or pressure on or around their eyes and nose.    Patient first noticed sinus pain 5-9 days ago.    Sinus pain is worse with Valsalva.    Denies history of unhealed nasal septal ulcer/nasal wound.    Denies antibiotic treatment for sinus infection in the last  year.    Denies history of deviated septum or nasal polyps.   Sore throat:    Reports sore throat.    Denies recent strep exposure.    Patient does not think they have strep.    Patient is able to swallow liquids and solid foods with ease.    Reports mild hoarseness. Patient doesn't believe hoarseness is due to voice strain.   Head and body aches:    Denies headache.    Denies sweats.    Denies chills.    Denies myalgia.    Denies fatigue.   Cough:    Reports cough.    Cough is not productive of sputum.   Wheezing and SOB:    Reports having asthma.    Current cold symptoms aggravate their asthma.    Reports using an albuterol inhaler for asthma.    Reports using albuterol every 6 hours or longer.    Denies COPD diagnosis.    Denies wheezing.    Denies shortness of breath.   Chest pain:    Denies chest pain.   Ear pressure/pain:    Reports pressure and a plugged or blocked sensation.   Dizziness:    Reports mild dizziness that does not interfere with daily activities.   Allergies:    Reports history of allergies.    Patient does not think symptoms are allergy-related.    Patient has known seasonal allergies, known pet allergies, and known dust allergies.   Flu exposure:    Denies recent exposure to confirmed flu diagnosis.    Reports a flu vaccine more than 6 months ago.   Patient denies the following red flags:    Changes in alertness or awareness    Symptoms suggesting respiratory distress    Symptoms suggesting airway obstruction    Decreased urination   Pregnancy/menstrual status/breastfeeding:    Denies being pregnant    Denies breastfeeding    Regarding last menstrual period, patient writes: Currently on my period   Self-exam:    No difficulty moving their chin toward their chest    Tonsillar edema    Tonsils appear normal    Neck lymph nodes feel normal    Mild periorbital edema    Denies antibiotic treatment for similar symptoms within the past month   Current medications   Denies taking over-the-counter  medication for current symptoms.   Reports taking Apo-Metoprolol, Buspirone, escitalopram 20 MG [Lexapro], ferrous sulfate 325 MG [Ironmar], and Valacyclovir.   Medication allergies   None reported.   Medication contraindication review   Denies history of anaphylactic reaction to beta-lactam antibiotics; aspirin triad; blood dyscrasia; bone marrow depression; catecholamine-releasing paraganglioma; coronary artery disease; coagulation disorder; congenital long QT syndrome; depression; electrolyte abnormalities; fungal infection; GI bleeding; GI obstruction; G6PD deficiency; heart arrhythmia; hypertension; kidney disease or hemodialysis; mononucleosis; myasthenia; recent myocardial infarction; NSAID-induced asthma/urticaria; Parkinson's disease; pheochromocytoma; porphyria; Reye syndrome; seizure disorder; ulcerative colitis; and urinary retention.   Denies history of metoclopramide-associated dystonic reaction and tardive dyskinesia.   No known history of amoxicillin-clavulanate-associated cholestatic jaundice or hepatic impairment.   No known history of azithromycin-associated cholestatic jaundice or hepatic impairment.   Past medical history   Immune conditions: Denies immunocompromising conditions. Denies history of cancer.   Social history   Non-smoker.   Assessment:   Patient determined to need a level of care not appropriate to be delivered through eVisit.   Plan:   Patient informed of need to seek in-person care      ----------   Electronically signed by LIBRADO De Los Santos on 2022-03-07 at 07:23AM   ----------   Patient Interview Transcript:   Why are you getting care through eVisit today? We can't guarantee a specific treatment or test. Your provider will decide what's best for you. Select all that apply.    I want to know if I have a cold or something more serious   Not selected:    I want to know if I need to be seen by a provider    I need a doctor's note    I want to be tested for COVID-19    I want to get  the COVID-19 vaccine    I think I'm having side effects from the COVID-19 vaccine    I just want to feel better!    None of the above   COVID-19 symptoms are similar to symptoms of other illnesses. This makes it difficult to diagnose. Please carefully consider each question and answer as best you can. This will help your provider make the right diagnosis. Which of these symptoms are bothering you? Select all that apply.    Cough    Stuffed-up nose or sinuses    Runny nose    Itchy or watery eyes    Itchy nose or sneezing    Sore throat    Hoarse voice or loss of voice   Not selected:    Shortness of breath    Fever    Loss of smell or taste    Headache    Sweats    Chills    Muscle or body aches    Fatigue or tiredness    Nausea or vomiting    Diarrhea    I don't have any of these symptoms   Do you have any of these conditions? These conditions can put you at increased risk for severe disease if you're infected with COVID-19. Select all that apply.    None of the above   Not selected:    Chronic lung disease, such as cystic fibrosis or interstitial fibrosis    Heart disease, such as congenital heart disease, congestive heart failure, or coronary artery disease    Disorder of the brain, spinal cord, or nerves and muscles, such as dementia, cerebral palsy, epilepsy, muscular dystrophy, or developmental delay    Metabolic disorder or mitochondrial disease    Cerebrovascular disease, such as stroke or another condition affecting the blood vessels or blood supply to the brain    Down syndrome    Mood disorder, including depression or schizophrenia spectrum disorders    Substance use disorder, such as alcohol, opioid, or cocaine use disorder    Tuberculosis   Do you live in a group care setting? Examples include: - Nursing home - Residential care - Psychiatric treatment facility - Group home - Dormitory - Board and care home - Homeless shelter - Foster care setting Select one.    No   Not selected:    Yes   Have you ever  been tested for COVID-19? Select one.    Yes   Not selected:    No   When was your most recent COVID-19 test? Select one.    Within the last week (specify date in MM/DD/YYYY format): 3/6/2022   Not selected:    7 to 14 days ago    15 to 30 days ago    1 to 3 months ago    More than 3 months ago   What type of COVID-19 test did you most recently have? There are two types of COVID-19 tests: - Viral tests check if you're currently infected with COVID-19. For these tests, a nose swab or saliva sample is taken. Viral tests include self-tests and tests done at a doctor's office, lab, or testing site. - Antibody tests check if you've been infected in the past. For these tests, your blood is drawn. Antibody tests can only be done at a doctor's office, lab, or testing site. Select one.    Viral self-test   Not selected:    Viral test at a doctor's office, lab, or testing site    Antibody test   What was the result of your most recent COVID-19 test? Select one.    Negative   Not selected:    Positive    I'm not sure   Have you gotten the COVID-19 vaccine? Select one.    Yes   Not selected:    No   How many doses of the COVID-19 vaccine have you gotten? This includes boosters. Select one.    3 doses   Not selected:    1 dose    2 doses   Which COVID-19 vaccine did you get for your first dose? Check your Vaccination Record Card under Product Name/. Select one.    Moderna   Not selected:    James & James's Gab Vaccine (J&J/Gab)    Pfizer-BioNTech (Pfizer)   Which COVID-19 vaccine did you get for your second dose? Check your Vaccination Record Card under Product Name/. Select one.    Moderna   Not selected:    James & James's Gab Vaccine (J&J/Gab)    Pfizer-BioNTech (Pfizer)   Which COVID-19 vaccine did you get for your third dose? Check your Vaccination Record Card under Product Name/. Select one.    Moderna   Not selected:    James & James's Gab Vaccine  "(J&J/Gab)    Pfizer-BioNTech (Pfizer)   When did you get your most recent dose of the COVID-19 vaccine?    More than 14 days ago   Not selected:    Less than 48 hours (2 days) ago    48 to 72 hours (3 days) ago    3 to 5 days ago    5 to 7 days ago    7 to 14 days ago   In the last 14 days, have you traveled outside of your local community? This includes travel by car, RV, bus, train, or plane. Travel increases your chances of getting and spreading COVID-19. Select one.    No   Not selected:    Yes   In the last 14 days, have you had close contact with someone who has coronavirus (COVID-19)? \"Close contact\" means any of these: - Living in the same household as someone with COVID-19. - Caring for someone with COVID-19. - Being within 6 feet of someone with COVID-19 for a total of at least 15 minutes over a 24-hour period. For example, three 5-minute exposures for a total of 15 minutes. - Being in direct contact with respiratory droplets from someone with COVID-19 (being coughed on, kissing, sharing utensils). Select one.    No, not that I know of   Not selected:    Yes, a confirmed case    Yes, a suspected case   When did your current symptoms start? If you know the exact date your symptoms started, choose Other and enter the month and day. Select one.    3 to 5 days ago   Not selected:    Less than 48 hours ago    6 to 9 days ago    10 to 14 days ago    2 to 4 weeks ago    More than a month ago    Other (specify)   Did your symptoms come on suddenly or gradually? Select one.    Gradually   Not selected:    Suddenly    I'm not sure   Do you cough so hard that it's made you gag or vomit? By gag, we mean has your coughing made you choke or dry heave? Select all that apply.    No   Not selected:    Yes, my coughing has made me gag    Yes, my coughing has made me vomit   When is your cough the worst? Select all that apply.    I'm not sure   Not selected:    In the morning, or when I wake up    During the day    At " "nighttime, or while I'm sleeping   Are you coughing up mucus or phlegm? Select one.    No, my cough is dry   Not selected:    Yes, a little    Yes, a lot   You mentioned having a stuffy nose or sinus congestion. Do you feel pain or pressure in your sinuses?    Yes   Not selected:    No    I'm not sure   Where do you feel sinus pain or pressure?    Around my eyes    Behind my nose   Not selected:    In my forehead    In my cheeks    In my upper teeth or jaw    I'm not sure   When did you first notice your sinus pain or pressure? Select one.    5 to 9 days ago   Not selected:    Less than 5 days ago    10 to 14 days ago    2 to 4 weeks ago    1 month ago or longer   Does coughing, sneezing, or leaning forward make your sinuses feel worse? Select one.    Yes   Not selected:    No    I'm not sure   What color is your nasal drainage? Select one.    Green   Not selected:    Clear    White    Yellow    My nose is stuffed but not draining or running    I'm not sure   Is your nasal drainage thick or thin? Select one.    Thin   Not selected:    Thick    I'm not sure   Is there any drainage (mucus) going down the back of your throat? This kind of drainage is also called \"postnasal drip.\" Select one.    Yes   Not selected:    No    I'm not sure   Can you swallow liquids and solid foods? A sore throat may be painful when swallowing, but it shouldn't prevent you from swallowing. Select one.    Yes, with ease   Not selected:    Yes, but it's uncomfortable    Yes, but it's painful    It's hard to swallow anything because it feels like liquids and food get stuck in my throat    No, I can't swallow anything, liquid or solid foods   Since your symptoms started, have you felt dizzy? Select one.    Yes, but I can continue with my regular daily activities   Not selected:    Yes, and it makes it hard to stand, walk, or do daily activities    No   Do you have chest pain? You might also feel it as discomfort, aching, tightness, or squeezing " in the chest. Select one.    No   Not selected:    Yes   Have you urinated at least 3 times in the last 24 hours? Select one.    Yes   Not selected:    No    I'm not sure   Changes in alertness or awareness may mean you need emergency care. Since your symptoms started, have you had any of these? Select all that apply.    None of the above   Not selected:    Confusion    Slurred speech    Not knowing where you are or what day it is    Difficulty staying conscious    Fainting or passing out   Do your symptoms include a whistling sound, or wheezing, when you breathe? Select one.    No   Not selected:    Yes    I'm not sure   Early in this interview, you told us you were hoarse or you'd lost your voice. How would you describe the changes to your voice? Select one.    It just sounds a little raspy   Not selected:    It's harder than usual to talk    I can barely talk at all   Is it possible that you strained your voice? Singing, yelling, or talking more or louder than usual can cause voice strain. Select one.    No   Not selected:    Yes    I'm not sure   Are your eyelids or the areas around your eyes puffy? Select one.    Yes, but I can easily open my eye(s)   Not selected:    Yes, and it's hard to open my eye(s)    Yes, and my eye(s) are completely swollen shut    No   Do you have any of these symptoms in your ear(s)? Select all that apply.    Pressure    Plugged or blocked sensation   Not selected:    Pain    Fullness    Crackling or popping    None of the above   Can you move your chin toward your chest?    Yes   Not selected:    No, my neck is too stiff   Are your tonsils larger than usual?    Yes   Not selected:    No    I've had my tonsils removed    I'm not sure   Is there any white or yellow pus on your tonsils?    No   Not selected:    Yes    I'm not sure   Are there red spots on the roof of your mouth or the back of your throat?    I'm not sure   Not selected:    Yes    No   Are your glands/lymph nodes  swollen, or does it hurt when you touch them?    No   Not selected:    Yes    I'm not sure   People with a very high body mass index (BMI) are at higher risk for developing complications from the flu and severe illness from COVID-19. To determine your BMI, we need to know your weight and height. Please enter your weight (in pounds).    Weight   Please enter your height.    Height   In the past 2 weeks, has anyone around you (such as at school, work, or home) had a confirmed diagnosis of strep throat? A confirmed diagnosis means that a throat swab and lab test were done to verify a strep throat infection. Select one.    No   Not selected:    Yes    I'm not sure   Do you think you might have strep throat? Select one.    No   Not selected:    Yes    I'm not sure   In the past week, has anyone around you (such as at school, work, or home) had a confirmed diagnosis of the flu? A confirmed diagnosis means that a nose swab was done to verify a flu infection. Select all that apply.    No   Not selected:    I live with someone who has the flu    I've been within touching distance of someone who has the flu    I've walked by, or sat about 3 feet away from, someone who has the flu    I've been in the same building as someone who has the flu    I'm not sure   Have you ever been diagnosed with asthma? Select one.    Yes   Not selected:    No   Are your cold symptoms making your asthma worse? Select one.    Yes   Not selected:    No   What medications or inhalers are you currently using for your asthma? Select all that apply.    Albuterol inhaler, as needed (such as ProAir, Proventil, Ventolin)   Not selected:    Inhaled steroid (such as Qvar, Pulmicort, Flovent)    Inhaled steroid with long-acting bronchodilator (such as Advair, Dulera, Symbicort)    I'm not sure    I'm not taking any medications for my asthma    I usually take medications for my asthma, but I ran out   How often are you using albuterol? If you're using  albuterol very frequently, you'll need to be seen in person. Select one.    Every 6 hours or longer   Not selected:    More than once an hour    Every 1 to 2 hours    Every 2 to 3 hours    Every 3 to 4 hours    Every 4 to 6 hours   Would you like a prescription for albuterol? Select one.    No   Not selected:    Yes   Have you ever been diagnosed with chronic obstructive pulmonary disease (COPD)? Select one.    No   Not selected:    Yes    I'm not sure   In the last year, how many times were you treated with antibiotics for a sinus infection? Select one.    None   Not selected:    1 to 3 times    4 or more times   Have you been diagnosed with a deviated septum or nasal polyps? The nose is divided into two nostrils by the septum. A crooked septum is called a deviated septum. Nasal polyps are growths inside the nose or sinuses. Select one.    No   Not selected:    Yes, but I had surgery to treat them    Yes, I have a deviated septum    Yes, I have nasal polyps    Yes, I have a deviated septum and nasal polyps    I'm not sure   Do you have a sore inside your nose that won't heal? Select one.    No   Not selected:    Yes    I'm not sure   Do you have allergies (pollen, dust mites, mold, animal dander)? Select one.    Yes   Not selected:    No    I'm not sure   What kind of allergies do you have? Select all that apply.    Seasonal allergies (hay fever)    Pet allergies    Dust allergies   Not selected:    None of the above    I'm not sure   Do you think your symptoms could be allergy-related? Select one.    No   Not selected:    Yes    I'm not sure   Have you had a flu shot this season? Select one.    Yes, more than 6 months ago   Not selected:    Yes, less than 2 weeks ago    Yes, 2 to 4 weeks ago    Yes, 1 to 3 months ago    Yes, 3 to 6 months ago    I'm not sure    No   The flu and COVID-19 can be more serious for people with certain conditions or characteristics. These questions help us figure out if you or anyone  you live with is at higher risk for complications from these infections. Do either of these statements apply to you? Select all that apply.    None of the above   Not selected:    I'm  or Native [place]    I'm a healthcare worker   Do you smoke tobacco? Select one.    No, never   Not selected:    Yes, every day    Yes, some days    No, I quit   Some conditions can put you at risk for more serious infections. Do any of these apply to you? Select all that apply.    None of the above   Not selected:    I've been hospitalized within the last 5 days    I have diabetes    I'm in close contact with a child in    Are you currently being treated for any of these conditions? Scroll to see all options. Select all that apply.    None of the above   Not selected:    Aspirin triad (also known as Samter's triad or ASA triad)    Asthma or hives from taking aspirin or other NSAIDs, such as ibuprofen or naproxen    Blockage or narrowing of the blood vessels of the heart    Blood dyscrasia, such anemia, leukemia, lymphoma, or myeloma    Bone marrow depression    Catecholamine-releasing paraganglioma    Blood clotting disorder    Congenital long QT syndrome    Depression    Difficulty urinating or completely emptying your bladder    Uncorrected electrolyte abnormalities    Fungal infection    Gastrointestinal (GI) bleeding    Gastrointestinal (GI) obstruction    G6PD deficiency    Recent heart attack    High blood pressure    Irregular heartbeat or heart rhythm    Kidney disease or hemodialysis    Mononucleosis (mono)    Myasthenia gravis    Parkinson's disease    Pheochromocytoma    Reye syndrome    Seizure disorder    Ulcerative colitis   Do you have any of these conditions that can affect the immune system? Scroll to see all options. Select all that apply.    None of these   Not selected:    History of bone marrow transplant    Chronic kidney disease    Chronic liver disease (including cirrhosis)    HIV/AIDS     Inflammatory bowel disease (Crohn's disease or ulcerative colitis)    Lupus    Moderate to severe plaque psoriasis    Multiple sclerosis    Rheumatoid arthritis    Sickle cell anemia    Alpha or beta thalassemia    History of solid organ transplant (kidney, liver, or heart)    History of spleen removal    An autoimmune disorder not listed here    A condition requiring treatment with long-term use of oral steroids (such as prednisone, prednisolone, or dexamethasone)   Have you ever been diagnosed with cancer? Select one.    No   Not selected:    Yes, I have cancer now    Yes, but I'm in remission   Have you ever had either of these conditions? Select all that apply.    No   Not selected:    Metoclopramide-associated dystonic reaction    Tardive dyskinesia   Do any of these apply to the people who live with you? Select all that apply.    None of the above   Not selected:    A child under the age of 5    An adult 65 or older    A person who is pregnant    A person who has given birth, had a miscarriage, had a pregnancy loss, or had an  in the last 2 weeks    An  or Native [place]   Does any member of your household have any of these medical conditions? Select all that apply.    None of the above   Not selected:    Asthma    Disorders of the brain, spinal cord, or nerves and muscles, such as dementia, cerebral palsy, epilepsy, muscular dystrophy, or developmental delay    Chronic lung disease, such as COPD or cystic fibrosis    Heart disease, such as congenital heart disease, congestive heart failure, or coronary artery disease    Cerebrovascular disease, such as stroke or another condition affecting the blood vessels or blood supply to the brain    Blood disorders, such as sickle cell disease    Diabetes    Metabolic disorders such as inherited metabolic disorders or mitochondrial disease    Kidney disorders    Liver disorders    Weakened immune system due to illness or medications such as  chemotherapy or steroids    Children under the age of 19 who are on long-term aspirin therapy    Extreme obesity (BMI > 40)   Are you pregnant? Select one.    No   Not selected:    Yes   When was your last menstrual period? If you don't currently have periods or no longer have periods, please briefly explain.    Currently on my period   Within the last 2 weeks, have you: - Given birth - Had a miscarriage - Had a pregnancy loss - Had an  Being postpartum (live birth or loss) within the last 2 weeks increases your risk of flu complications. Select one.    No   Not selected:    Yes   Are you breastfeeding? Select one.    No   Not selected:    Yes   Just a few more questions about medications, and then you're finished. Have you used any non-prescription medications or nasal sprays for your current symptoms? Examples include saline sprays, decongestants, NyQuil, and Tylenol. Select one.    No   Not selected:    Yes   In the past month, have you taken antibiotics for similar symptoms? Examples of antibiotics include amoxicillin, amoxicillin-clavulanate (Augmentin), penicillin, cefdinir (Omnicef), doxycycline, and clindamycin (Cleocin). Select one.    No   Not selected:    Yes    I'm not sure   Have you taken any monoamine oxidase inhibitor (MAOI) medications in the last 14 days? Examples include rasagiline (Azilect), selegiline (Eldepryl, Zelapar), isocarboxazid (Marplan), phenelzine (Nardil), and tranylcypromine (Parnate). Select one.    No   Not selected:    Yes    I'm not sure   Do you take Kynmobi or Apokyn (apomorphine)? Select one.    No   Not selected:    Yes    I'm not sure   Are you taking any other medications or supplements? On the next screen, you need to list all vitamins, supplements, non-prescription medications (such as aspirin or Aleve), and prescription medications that you're taking. Select one.    Yes   Not selected:    Yes, but I'm not sure what they are    No   Have you ever had an allergic  "or bad reaction to any medication? Select one.    Yes   Not selected:    No   Have you had an allergic or bad reaction to any of these medications? Select all that apply.    None of the above   Not selected:    Baloxavir (Xofluza)    Benzonatate (Tessalon Perles)    Fluconazole, itraconazole, or terconazole (brands include Diflucan, Sporanox, Terazol)    Oseltamivir (Tamiflu) or zanamivir (Relenza)    I'm not sure   Have you had an allergic or bad reaction to any of these antibiotic medications? Select all that apply.    None of the above   Not selected:    Penicillin or any \"-cillin\" antibiotic, such as amoxicillin, ampicillin, dicloxacillin, nafcillin, or piperacillin (Brands include Augmentin, Unasyn, and Zosyn)    Tetracycline or any \"-cycline\" antibiotic, such as doxycycline, demeclocycline, minocycline (Brands include Declomycin, Doryx, Dynacin, Oracea, Monodox, Panmycin, and Vibramycin)    Ciprofloxacin or any \"-floxacin\" antibiotic, such as gemifloxacin, levofloxacin, moxifloxacin, or ofloxacin (Brands include Factive, Cipro, Floxin, and Levaquin)    Cephalexin or any \"cef-\" antibiotic, such as cefazolin, cefdinir, cefuroxime, ceftriaxone, ceftazidime, or cefepime (Brands include Ancef, Ceftin, Fortaz, Keflex, Maxipime, Rocephin, and Simplicef)    Azithromycin or any \"-thromycin\" antibiotic, such as erythromycin or clarithromycin (Brands include Biaxin, Erythrocin, Z-sharath, and Zithromax)    Clindamycin or lincomycin (Brands include Cleocin and Lincocin)    I'm not sure   Have you had an allergic or bad reaction to any of these medications? Select all that apply.    None of the above   Not selected:    Albuterol or a similar medication    Corticosteroid (steroid) medication, including topical steroids, inhaled steroids, nasal steroids, or oral steroids (budesonide, ciclesonide, dexamethasone, flunisolide, fluticasone, methylprednisolone, triamcinolone, prednisone (or brand names Alvesco, Deltasone, Flovent, " Medrol, Nasacort, Rhinocort, or Veramyst)    Metoclopramide (Reglan)    Ondansetron (Zuplenz, Zofran ODT, Zofran)    Prochlorperazine (Compazine)    I'm not sure   Have you had an allergic or bad reaction to any of these eye drops or nasal sprays? Scroll to see all options. Select all that apply.    None of the above   Not selected:    Azelastine (Astelin, Astepro, Optivar)    Cromolyn (Crolom, NasalCrom)    Ipratropium (Atrovent)    Ketotifen (Alaway, Zaditor)    Pheniramine/naphazoline (Naphcon-A, Opcon-A, Visine-A)    Olopatadine (Pataday, Patanol, Pazeo)    I'm not sure   Have you had an allergic or bad reaction to any of these non-prescription medications? Scroll to see all options. Select all that apply.    None of the above   Not selected:    Acetaminophen (Tylenol)    Aspirin    Cetirizine (Zyrtec)    Chlorpheniramine (Aller-chlor, Chlor-Trimeton)    Dextromethorphan (Delsym, Robitussin, Vicks DayQuil Cough)    Diphenhydramine (Benadryl)    Fexofenadine (Allegra)    Guaifenesin (Mucinex)    Guaifenesin/dextromethorphan (Delsym DM, Mucinex DM, Robitussin DM)    Ibuprofen (Advil, Motrin, Midol)    Loratadine (Alavert, Claritin)    Oxymetazoline (Afrin)    Phenylephrine (Sudafed)    I'm not sure   Are you allergic to milk or to the proteins found in milk (for example, whey or casein)? A milk allergy is different from lactose intolerance. Select one.    No   Not selected:    Yes    I'm not sure   Have you ever had jaundice or liver problems as a result of taking amoxicillin-clavulanate (Augmentin)? Jaundice is a condition in which the skin and the whites of the eyes turn yellow. Select all that apply.    No, not that I know of   Not selected:    Yes, jaundice    Yes, liver problems   Have you ever had jaundice or liver problems as a result of taking azithromycin (Zithromax, Zmax)? Jaundice is a condition in which the skin and the whites of the eyes turn yellow. Select all that apply.    No, not that I know of    Not selected:    Yes, jaundice    Yes, liver problems   Do you need a doctor's note? A doctor's note confirms that you received care today and states when you can return to school or work. It does not contain information about your diagnosis or treatment plan. Your provider will make the final decision on whether to give you a doctor's note and for how long. Doctor's notes CANNOT be backdated. We can't provide medical leave paperwork through this type of visit. If more paperwork is needed to request time off, contact your primary care provider. Select one.    Today only (1 day)   Not selected:    Today and tomorrow (2 days)    3 days    7 days    10 days    14 days    No   Is there anything else you'd like to tell us about your symptoms?   The patient did not enter any additional information.   ----------   Medical history   The following information was received from the EMR on March 07, 2022.   Allergies:    No Known Allergies   - Allergy Type:   - Reaction:   - Severity:   - Status: active   Medications:    BUSPIRONE HCL 10 MG PO TABS   - Route: Oral   - Start Date: February 25, 2022   - End Date: None   - Status: active    ALBUTEROL SULFATE  (90 BASE) MCG/ACT IN AERS   - Route: Inhalation   - Start Date: October 19, 2021   - End Date: None   - Status: active    FERROUS SULFATE 325 (65 FE) MG PO TABS   - Route: Oral   - Start Date: February 25, 2022   - End Date: None   - Status: active    ESCITALOPRAM OXALATE 20 MG PO TABS   - Route: Oral   - Start Date: January 13, 2022   - End Date: None   - Status: active    ACCU-CHEK GUIDE W/DEVICE KIT   - Route:   - Start Date: July 15, 2021   - End Date: None   - Status: active    DICYCLOMINE HCL 20 MG PO TABS   - Route: Oral   - Start Date: May 31, 2021   - End Date: None   - Status: active    ACCU-CHEK SOFTCLIX LANCETS MISC   - Route:   - Start Date: July 15, 2021   - End Date: None   - Status: active    ESCITALOPRAM OXALATE 20 MG PO TABS   - Route: Oral   - Start  Date: February 25, 2022   - End Date: None   - Status: active    ACCU-CHEK GUIDE VI STRP   - Route:   - Start Date: July 05, 2021   - End Date: None   - Status: active    METFORMIN  MG PO TABS   - Route: Oral   - Start Date: February 25, 2022   - End Date: None   - Status: active    ONDANSETRON 4 MG PO TBDP   - Route: Sublingual   - Start Date: May 31, 2021   - End Date: None   - Status: active    BUSPIRONE HCL 10 MG PO TABS   - Route: Oral   - Start Date: February 23, 2022   - End Date: None   - Status: active    METOPROLOL SUCCINATE ER 25 MG PO TB24   - Route: Oral   - Start Date: February 25, 2022   - End Date: None   - Status: active   Problem list:    Palpitations   - Category: Problem List Item   - Health Status:   - Start Date: October 29, 2021   - End Date: None   - Status: active    Prediabetes   - Category: Problem List Item   - Health Status:   - Start Date: February 25, 2022   - End Date: None   - Status: active    Vitamin D deficiency   - Category: Problem List Item   - Health Status:   - Start Date: February 25, 2022   - End Date: None   - Status: active    Other fatigue   - Category: Problem List Item   - Health Status:   - Start Date: February 25, 2022   - End Date: None   - Status: active    Sleep apnea   - Category: Problem List Item   - Health Status:   - Start Date: February 25, 2022   - End Date: None   - Status: active    Mixed hyperlipidemia   - Category: Problem List Item   - Health Status:   - Start Date: February 25, 2022   - End Date: None   - Status: active    Anemia   - Category: Problem List Item   - Health Status:   - Start Date: February 25, 2022   - End Date: None   - Status: active    Anxiety   - Category: Problem List Item   - Health Status:   - Start Date: February 25, 2022   - End Date: None   - Status: active    Tachycardia   - Category: Problem List Item   - Health Status:   - Start Date: February 25, 2022   - End Date: None   - Status: active

## 2022-03-29 ENCOUNTER — TELEPHONE (OUTPATIENT)
Dept: FAMILY MEDICINE CLINIC | Age: 37
End: 2022-03-29

## 2022-03-29 NOTE — TELEPHONE ENCOUNTER
Caller: Katrina Bahena    Relationship: Self    Best call back number: [phone]    What medication are you requesting: PCP RECOOMMENDATION    What are your current symptoms: YEAST INFECTION    How long have you been experiencing symptoms: 03/29/22    Have you had these symptoms before:    [x] Yes  [] No    Have you been treated for these symptoms before:   [x] Yes  [] No    If a prescription is needed, what is your preferred pharmacy and phone number: [facility] [place]     Additional notes:  THE PATIENT STATED SHE NEEDS PCP TO PRESCRIBE A MEDICATION FOR HER YEAST INFECTION AFTER BEING ON AN ANTIBIOTIC.     THE PATIENT STATED SHE RECEIVED A MESSAGE FROM PCP ABOUT STARTING CHOLESTEROL MEDICATION. SHE STATED SHE WOULD LIKE TO START THIS MEDICATION. SHE WOULD LIKE TO DO THIS AND SHE WOULD LIKE TO DISCUSS A SLEEP MASK PRESCRIPTION. PATIENT REQUEST A CALL BACK FROM SAKSHI.

## 2022-03-30 DIAGNOSIS — N76.0 ACUTE VAGINITIS: ICD-10-CM

## 2022-03-30 DIAGNOSIS — E78.2 MIXED HYPERLIPIDEMIA: Primary | ICD-10-CM

## 2022-03-30 RX ORDER — FLUCONAZOLE 150 MG/1
150 TABLET ORAL ONCE
Qty: 1 TABLET | Refills: 0 | Status: SHIPPED | OUTPATIENT
Start: 2022-03-30 | End: 2022-04-07

## 2022-03-30 RX ORDER — PRAVASTATIN SODIUM 10 MG
10 TABLET ORAL DAILY
Qty: 30 TABLET | Refills: 5 | Status: SHIPPED | OUTPATIENT
Start: 2022-03-30 | End: 2022-11-18 | Stop reason: SDUPTHER

## 2022-03-31 NOTE — TELEPHONE ENCOUNTER
Diflucan and pravastatin sent to [facility] pharmacy.  I have order for mask for cpap on my desk.

## 2022-05-20 RX ORDER — BUSPIRONE HYDROCHLORIDE 10 MG/1
10 TABLET ORAL 2 TIMES DAILY PRN
Qty: 60 TABLET | Refills: 0 | Status: CANCELLED | OUTPATIENT
Start: 2022-05-20

## 2022-09-15 ENCOUNTER — TELEPHONE (OUTPATIENT)
Dept: FAMILY MEDICINE CLINIC | Age: 37
End: 2022-09-15

## 2022-09-15 NOTE — TELEPHONE ENCOUNTER
Caller: VIPUL     Relationship: Other    Best call back number: [phone]    Equipment requested: C-PAP SUPPLIES       Prescribing Provider: ZEINA ESCOBAR     Additional information or concerns: CALLING TO CHECK IF FAX WAS RECEIVED ON 09/13/2022  IT NEEDS TO BE SIGNED BY ZEINA ESCOBAR BEFORE SUPPLIES CAN BE SENT

## 2022-09-26 NOTE — TELEPHONE ENCOUNTER
Caller: [employer]    Relationship to patient: Other    Best call back number: [phone]    Patient is needing: [employer] [facility]'s what's in store IS CALLING TO SEE IF OFFICE RECEIVED A FAX REGARDING C-PAP SUPPLIES. PLEASE CALL AND ADVISE.

## 2022-09-27 DIAGNOSIS — R00.0 TACHYCARDIA: ICD-10-CM

## 2022-09-27 DIAGNOSIS — R73.03 PREDIABETES: ICD-10-CM

## 2022-09-27 NOTE — TELEPHONE ENCOUNTER
Spoke to dick, he states that he doesn't see it in their system, however, he will have a colleague look and call back if needed

## 2022-09-28 RX ORDER — METOPROLOL SUCCINATE 25 MG/1
12.5 TABLET, EXTENDED RELEASE ORAL DAILY
Qty: 15 TABLET | Refills: 0 | Status: SHIPPED | OUTPATIENT
Start: 2022-09-28 | End: 2022-11-18 | Stop reason: SDUPTHER

## 2022-11-18 DIAGNOSIS — E78.2 MIXED HYPERLIPIDEMIA: ICD-10-CM

## 2022-11-18 DIAGNOSIS — R00.0 TACHYCARDIA: ICD-10-CM

## 2022-11-18 DIAGNOSIS — F41.9 ANXIETY: ICD-10-CM

## 2022-11-18 RX ORDER — ESCITALOPRAM OXALATE 20 MG/1
20 TABLET ORAL DAILY
Qty: 30 TABLET | Refills: 0 | Status: SHIPPED | OUTPATIENT
Start: 2022-11-18 | End: 2022-12-01 | Stop reason: SDUPTHER

## 2022-11-18 RX ORDER — PRAVASTATIN SODIUM 10 MG
10 TABLET ORAL DAILY
Qty: 30 TABLET | Refills: 0 | Status: SHIPPED | OUTPATIENT
Start: 2022-11-18 | End: 2022-12-15 | Stop reason: SDUPTHER

## 2022-11-18 RX ORDER — METOPROLOL SUCCINATE 25 MG/1
12.5 TABLET, EXTENDED RELEASE ORAL DAILY
Qty: 15 TABLET | Refills: 0 | Status: SHIPPED | OUTPATIENT
Start: 2022-11-18 | End: 2022-12-01 | Stop reason: DRUGHIGH

## 2022-11-18 RX ORDER — BUSPIRONE HYDROCHLORIDE 10 MG/1
10 TABLET ORAL 2 TIMES DAILY PRN
Qty: 60 TABLET | Refills: 0 | Status: SHIPPED | OUTPATIENT
Start: 2022-11-18 | End: 2022-12-01 | Stop reason: SDUPTHER

## 2022-11-18 NOTE — TELEPHONE ENCOUNTER
Caller: Josef Katrina ASHLYN    Relationship: Self    Best call back number: [phone]    Requested Prescriptions:   Requested Prescriptions     Pending Prescriptions Disp Refills   • metoprolol succinate XL (Toprol XL) 25 MG 24 hr tablet 15 tablet 0     Sig: Take 1/2 tablet by mouth Daily.   • pravastatin (PRAVACHOL) 10 MG tablet 30 tablet 5     Sig: Take 1 tablet by mouth Daily.   • escitalopram (LEXAPRO) 20 MG tablet 90 tablet 1     Sig: Take 1 tablet by mouth Daily.   • busPIRone (BUSPAR) 10 MG tablet 180 tablet 1     Sig: Take 1 tablet by mouth 2 (Two) Times a Day As Needed for anxiety        Pharmacy where request should be sent: [facility]     Additional details provided by patient: PATIENT IS TOTALLY OUT OF MEDICATION, DOES HAVE AN APPOINTMENT FOR 12/1     Does the patient have less than a 3 day supply:  [x] Yes  [] No    Silvana Ramírez Rep   11/18/22 09:48 EST

## 2022-12-01 ENCOUNTER — OFFICE VISIT (OUTPATIENT)
Dept: FAMILY MEDICINE CLINIC | Age: 37
End: 2022-12-01

## 2022-12-01 VITALS
TEMPERATURE: 97.9 F | HEIGHT: 66 IN | SYSTOLIC BLOOD PRESSURE: 135 MMHG | DIASTOLIC BLOOD PRESSURE: 81 MMHG | BODY MASS INDEX: 47.09 KG/M2 | HEART RATE: 82 BPM | WEIGHT: 293 LBS

## 2022-12-01 DIAGNOSIS — R73.03 PREDIABETES: ICD-10-CM

## 2022-12-01 DIAGNOSIS — F41.9 ANXIETY: ICD-10-CM

## 2022-12-01 DIAGNOSIS — R06.2 WHEEZING: ICD-10-CM

## 2022-12-01 DIAGNOSIS — D64.9 ANEMIA, UNSPECIFIED TYPE: ICD-10-CM

## 2022-12-01 DIAGNOSIS — K21.9 GASTROESOPHAGEAL REFLUX DISEASE WITHOUT ESOPHAGITIS: ICD-10-CM

## 2022-12-01 DIAGNOSIS — E78.2 MIXED HYPERLIPIDEMIA: ICD-10-CM

## 2022-12-01 DIAGNOSIS — R00.2 PALPITATIONS: ICD-10-CM

## 2022-12-01 DIAGNOSIS — E55.9 VITAMIN D DEFICIENCY: Primary | ICD-10-CM

## 2022-12-01 PROCEDURE — 99214 OFFICE O/P EST MOD 30 MIN: CPT | Performed by: NURSE PRACTITIONER

## 2022-12-01 RX ORDER — METFORMIN HYDROCHLORIDE 500 MG/1
500 TABLET, EXTENDED RELEASE ORAL DAILY
Qty: 90 TABLET | Refills: 1 | Status: SHIPPED | OUTPATIENT
Start: 2022-12-01

## 2022-12-01 RX ORDER — ESCITALOPRAM OXALATE 20 MG/1
20 TABLET ORAL DAILY
Qty: 90 TABLET | Refills: 1 | Status: SHIPPED | OUTPATIENT
Start: 2022-12-01

## 2022-12-01 RX ORDER — METOPROLOL SUCCINATE 25 MG/1
25 TABLET, EXTENDED RELEASE ORAL DAILY
Qty: 90 TABLET | Refills: 1 | Status: SHIPPED | OUTPATIENT
Start: 2022-12-01

## 2022-12-01 RX ORDER — BUSPIRONE HYDROCHLORIDE 10 MG/1
10 TABLET ORAL 2 TIMES DAILY PRN
Qty: 180 TABLET | Refills: 1 | Status: SHIPPED | OUTPATIENT
Start: 2022-12-01

## 2022-12-01 RX ORDER — LEVALBUTEROL TARTRATE 45 UG/1
1-2 AEROSOL, METERED ORAL EVERY 4 HOURS PRN
Qty: 15 G | Refills: 5 | Status: SHIPPED | OUTPATIENT
Start: 2022-12-01

## 2022-12-01 NOTE — PROGRESS NOTES
"Chief Complaint  Diabetes and Hyperlipidemia    Subjective          Katrina Bahena presents to [facility]     Patient is a 37-year-old female who is here today to follow-up regarding prediabetes.  Last hemoglobin A1c was within normal limits.  She had persistent stomach upset and loose stools with use of metformin regular release 500 mg daily.  She recalls tolerating metformin better previously.      Anxiety is stable with use of buspirone 10 mg twice daily and Lexapro 20 mg once daily.  Denies side effects and requests refills.    Mild and intermittent tachycardia is improved with use of metoprolol 12.5 mg daily.  She does feel as if she would be able to increase the dosage for better symptom management.    Continues to take a daily multivitamin and ferrous sulfate for iron deficiency and vitamin D deficiency.  Denies any side effects.    She did start pravastatin 10 mg at bedtime after last visit and noted to have continued elevations on lipid panel.  She is tolerating medication without side effects.  Requests refills.    Reports some faint intermittent wheezing either her first thing in the morning related night.  She denies any recent illness or respiratory infections.  She feels of seasonal allergies are under good control.  Is not currently taking any antihistamine.  Does also have some mild acid reflux for which she takes Zantac over-the-counter.  She has an albuterol inhaler at home that she can use as needed but it contributes to fast heart rate and wonders if there is another option for treatment of intermittent wheezing.  She denies any mucus production currently or fever.  No shortness of breath.  Last menstrual cycle 3 weeks ago.     Objective   Vital Signs:   Vitals:    12/01/22 1606   BP: 135/81   Pulse: 82   Temp: 97.9 °F (36.6 °C)   TempSrc: Oral   Weight: 134 kg (295 lb 9.6 oz)   Height: 167.6 cm (66\")      Body mass index is 47.71 kg/m².  Physical Exam  Vitals " reviewed.   Constitutional:       General: She is not in acute distress.     Appearance: Normal appearance. She is well-developed.   Cardiovascular:      Rate and Rhythm: Normal rate and regular rhythm.      Heart sounds: Normal heart sounds.   Pulmonary:      Effort: Pulmonary effort is normal.      Breath sounds: Normal breath sounds.   Musculoskeletal:      Right lower leg: No edema.      Left lower leg: No edema.   Skin:     General: Skin is warm and dry.   Neurological:      General: No focal deficit present.      Mental Status: She is alert.   Psychiatric:         Attention and Perception: Attention normal.         Mood and Affect: Mood and affect normal.         Behavior: Behavior normal.           Current Outpatient Medications:   •  Accu-Chek Softclix Lancets lancets, Use as instructed once daily, Disp: 100 each, Rfl: 0  •  Blood Glucose Monitoring Suppl (Accu-Chek Guide) w/Device kit, Use as instructed once daily, Disp: 1 kit, Rfl: 0  •  busPIRone (BUSPAR) 10 MG tablet, Take 1 tablet by mouth 2 (Two) Times a Day As Needed for anxiety, Disp: 180 tablet, Rfl: 1  •  dicyclomine (BENTYL) 20 MG tablet, Take 1 tablet by mouth Every 6 (Six) Hours As Needed., Disp: 30 tablet, Rfl: 0  •  escitalopram (LEXAPRO) 20 MG tablet, Take 1 tablet by mouth Daily., Disp: 90 tablet, Rfl: 1  •  ferrous sulfate 325 (65 FE) MG tablet, Take 325 mg by mouth Daily With Breakfast., Disp: , Rfl:   •  glucose blood (Accu-Chek Guide) test strip, Use as instructed once daily, Disp: 100 each, Rfl: 0  •  ondansetron ODT (Zofran ODT) 4 MG disintegrating tablet, Place 1 to 2 tablets under the tongue Every 6 (Six) Hours As Needed for nausea and vomiting., Disp: 15 tablet, Rfl: 0  •  pravastatin (PRAVACHOL) 10 MG tablet, Take 1 tablet by mouth Daily., Disp: 30 tablet, Rfl: 0  •  levalbuterol (XOPENEX HFA) 45 MCG/ACT inhaler, Inhale 1-2 puffs Every 4 (Four) Hours As Needed for Wheezing., Disp: 15 g, Rfl: 5  •  metFORMIN ER (GLUCOPHAGE-XR) 500  MG 24 hr tablet, Take 1 tablet by mouth Daily with a meal., Disp: 90 tablet, Rfl: 1  •  metoprolol succinate XL (Toprol XL) 25 MG 24 hr tablet, Take 1 tablet by mouth Daily., Disp: 90 tablet, Rfl: 1       Result Review :     CMP    CMP 2/25/22   Glucose 94   BUN 13   Creatinine 0.98   eGFR Non African Am 65   Sodium 139   Potassium 4.2   Chloride 101   Calcium 9.5   Albumin 4.20   Total Bilirubin 0.2   Alkaline Phosphatase 60   AST (SGOT) 19   ALT (SGPT) 22           CBC    CBC 2/25/22   WBC 6.85   RBC 4.77   Hemoglobin 13.2   Hematocrit 38.9   MCV 81.6   MCH 27.7   MCHC 33.9   RDW 13.4   Platelets 289           CBC w/diff    CBC w/Diff 2/25/22   WBC 6.85   RBC 4.77   Hemoglobin 13.2   Hematocrit 38.9   MCV 81.6   MCH 27.7   MCHC 33.9   RDW 13.4   Platelets 289   Neutrophil Rel % 62.8   Immature Granulocyte Rel % 0.6 (A)   Lymphocyte Rel % 27.7   Monocyte Rel % 6.0   Eosinophil Rel % 2.5   Basophil Rel % 0.4   (A) Abnormal value            Lipid Panel    Lipid Panel 2/25/22   Total Cholesterol 250 (A)   Triglycerides 327 (A)   HDL Cholesterol 49   VLDL Cholesterol 59 (A)   LDL Cholesterol  142 (A)   LDL/HDL Ratio 2.77   (A) Abnormal value                Electrolytes    Electrolytes 2/25/22   Sodium 139   Potassium 4.2   Chloride 101   Calcium 9.5           Most Recent A1C    HGBA1C Most Recent 2/25/22   Hemoglobin A1C 5.30                    Assessment and Plan {WrapupProblem List  Visit Diagnosis  ROS  Review (Popup)  Health Maintenance  Quality  BestPractice  Medications  SmartSets  SnapShot Encounters  Media :23}   Diagnoses and all orders for this visit:    1. Vitamin D deficiency (Primary)  -     Vitamin D 25 hydroxy; Future    2. Prediabetes  Assessment & Plan:  Decrease in weight and lower sugar diet and increasing exercise will also help manage prediabetes.  She is to follow-up if she persists with any side effects from extended release metformin.    Orders:  -     metFORMIN ER (GLUCOPHAGE-XR)  500 MG 24 hr tablet; Take 1 tablet by mouth Daily with a meal.  Dispense: 90 tablet; Refill: 1    3. Anxiety  -     escitalopram (LEXAPRO) 20 MG tablet; Take 1 tablet by mouth Daily.  Dispense: 90 tablet; Refill: 1  -     busPIRone (BUSPAR) 10 MG tablet; Take 1 tablet by mouth 2 (Two) Times a Day As Needed for anxiety  Dispense: 180 tablet; Refill: 1    4. Mixed hyperlipidemia  -     Comprehensive metabolic panel; Future  -     Lipid panel; Future    5. Gastroesophageal reflux disease without esophagitis  Assessment & Plan:  Okay to continue over-the-counter histamine blocker for acid reflux symptoms.  Follow-up if symptoms or not stable.      6. Wheezing  Assessment & Plan:  Lung exam is normal.  Encouraged that she start taking a daily over-the-counter plain antihistamine for better allergy control and monitor for good control of acid reflux symptoms.  Previous tachycardia with use of albuterol.  I will change this to lev albuterol but prior authorization will likely be necessary.  Follow-up for any persisting symptoms.    Orders:  -     levalbuterol (XOPENEX HFA) 45 MCG/ACT inhaler; Inhale 1-2 puffs Every 4 (Four) Hours As Needed for Wheezing.  Dispense: 15 g; Refill: 5    7. Palpitations  Assessment & Plan:  Increase metoprolol to 25 mg once daily.  Follow-up if not improving.  Defers cardiology visit at this time.    Orders:  -     metoprolol succinate XL (Toprol XL) 25 MG 24 hr tablet; Take 1 tablet by mouth Daily.  Dispense: 90 tablet; Refill: 1    8. Anemia, unspecified type  -     Iron and TIBC; Future  -     CBC w AUTO Differential; Future      Follow Up    No follow-ups on file.  Patient was given instructions and counseling regarding her condition or for health maintenance advice. Please see specific information pulled into the AVS if appropriate.

## 2022-12-01 NOTE — ASSESSMENT & PLAN NOTE
Increase metoprolol to 25 mg once daily.  Follow-up if not improving.  Defers cardiology visit at this time.

## 2022-12-01 NOTE — ASSESSMENT & PLAN NOTE
Lung exam is normal.  Encouraged that she start taking a daily over-the-counter plain antihistamine for better allergy control and monitor for good control of acid reflux symptoms.  Previous tachycardia with use of albuterol.  I will change this to lev albuterol but prior authorization will likely be necessary.  Follow-up for any persisting symptoms.

## 2022-12-01 NOTE — ASSESSMENT & PLAN NOTE
Okay to continue over-the-counter histamine blocker for acid reflux symptoms.  Follow-up if symptoms or not stable.

## 2022-12-01 NOTE — ASSESSMENT & PLAN NOTE
Decrease in weight and lower sugar diet and increasing exercise will also help manage prediabetes.  She is to follow-up if she persists with any side effects from extended release metformin.

## 2022-12-15 DIAGNOSIS — E78.2 MIXED HYPERLIPIDEMIA: ICD-10-CM

## 2022-12-16 RX ORDER — PRAVASTATIN SODIUM 10 MG
10 TABLET ORAL DAILY
Qty: 30 TABLET | Refills: 0 | Status: SHIPPED | OUTPATIENT
Start: 2022-12-16 | End: 2023-01-26 | Stop reason: DRUGHIGH

## 2022-12-16 NOTE — TELEPHONE ENCOUNTER
Rx Refill Note  Requested Prescriptions     Pending Prescriptions Disp Refills   • pravastatin (PRAVACHOL) 10 MG tablet 30 tablet 0     Sig: Take 1 tablet by mouth Daily.      Last office visit with prescribing clinician: 12/1/2022     Next office visit with prescribing clinician: Visit date not found     Spoke with patient. She will be in next week to have lab work done     Coby Parekh LPN  12/16/22, 16:13 EST

## 2022-12-20 ENCOUNTER — TELEPHONE (OUTPATIENT)
Dept: FAMILY MEDICINE CLINIC | Age: 37
End: 2022-12-20

## 2022-12-20 NOTE — TELEPHONE ENCOUNTER
Called pt on 12/20/22 about labs were ordered on 12/1/22. Pt didn't answer-left VM. (1st attempt)./wood

## 2022-12-22 ENCOUNTER — LAB (OUTPATIENT)
Dept: LAB | Facility: HOSPITAL | Age: 37
End: 2022-12-22

## 2022-12-22 DIAGNOSIS — E78.2 MIXED HYPERLIPIDEMIA: ICD-10-CM

## 2022-12-22 DIAGNOSIS — D64.9 ANEMIA, UNSPECIFIED TYPE: ICD-10-CM

## 2022-12-22 DIAGNOSIS — E55.9 VITAMIN D DEFICIENCY: ICD-10-CM

## 2022-12-22 LAB
BASOPHILS # BLD AUTO: 0.02 10*3/MM3 (ref 0–0.2)
BASOPHILS NFR BLD AUTO: 0.4 % (ref 0–1.5)
DEPRECATED RDW RBC AUTO: 38.7 FL (ref 37–54)
EOSINOPHIL # BLD AUTO: 0.15 10*3/MM3 (ref 0–0.4)
EOSINOPHIL NFR BLD AUTO: 3 % (ref 0.3–6.2)
ERYTHROCYTE [DISTWIDTH] IN BLOOD BY AUTOMATED COUNT: 12.5 % (ref 12.3–15.4)
HCT VFR BLD AUTO: 39.7 % (ref 34–46.6)
HGB BLD-MCNC: 13.4 G/DL (ref 12–15.9)
IMM GRANULOCYTES # BLD AUTO: 0.02 10*3/MM3 (ref 0–0.05)
IMM GRANULOCYTES NFR BLD AUTO: 0.4 % (ref 0–0.5)
LYMPHOCYTES # BLD AUTO: 1.6 10*3/MM3 (ref 0.7–3.1)
LYMPHOCYTES NFR BLD AUTO: 31.7 % (ref 19.6–45.3)
MCH RBC QN AUTO: 28.7 PG (ref 26.6–33)
MCHC RBC AUTO-ENTMCNC: 33.8 G/DL (ref 31.5–35.7)
MCV RBC AUTO: 85 FL (ref 79–97)
MONOCYTES # BLD AUTO: 0.25 10*3/MM3 (ref 0.1–0.9)
MONOCYTES NFR BLD AUTO: 5 % (ref 5–12)
NEUTROPHILS NFR BLD AUTO: 3.01 10*3/MM3 (ref 1.7–7)
NEUTROPHILS NFR BLD AUTO: 59.5 % (ref 42.7–76)
PLATELET # BLD AUTO: 242 10*3/MM3 (ref 140–450)
PMV BLD AUTO: 9.1 FL (ref 6–12)
RBC # BLD AUTO: 4.67 10*6/MM3 (ref 3.77–5.28)
WBC NRBC COR # BLD: 5.05 10*3/MM3 (ref 3.4–10.8)

## 2022-12-22 PROCEDURE — 83540 ASSAY OF IRON: CPT

## 2022-12-22 PROCEDURE — 82306 VITAMIN D 25 HYDROXY: CPT

## 2022-12-22 PROCEDURE — 80061 LIPID PANEL: CPT

## 2022-12-22 PROCEDURE — 85025 COMPLETE CBC W/AUTO DIFF WBC: CPT

## 2022-12-22 PROCEDURE — 80053 COMPREHEN METABOLIC PANEL: CPT

## 2022-12-22 PROCEDURE — 84466 ASSAY OF TRANSFERRIN: CPT

## 2022-12-22 PROCEDURE — 36415 COLL VENOUS BLD VENIPUNCTURE: CPT

## 2022-12-23 LAB
25(OH)D3 SERPL-MCNC: 24.4 NG/ML (ref 30–100)
ALBUMIN SERPL-MCNC: 4.4 G/DL (ref 3.5–5.2)
ALBUMIN/GLOB SERPL: 1.8 G/DL
ALP SERPL-CCNC: 61 U/L (ref 39–117)
ALT SERPL W P-5'-P-CCNC: 21 U/L (ref 1–33)
ANION GAP SERPL CALCULATED.3IONS-SCNC: 8.3 MMOL/L (ref 5–15)
AST SERPL-CCNC: 20 U/L (ref 1–32)
BILIRUB SERPL-MCNC: <0.2 MG/DL (ref 0–1.2)
BUN SERPL-MCNC: 11 MG/DL (ref 6–20)
BUN/CREAT SERPL: 11.8 (ref 7–25)
CALCIUM SPEC-SCNC: 9.1 MG/DL (ref 8.6–10.5)
CHLORIDE SERPL-SCNC: 100 MMOL/L (ref 98–107)
CHOLEST SERPL-MCNC: 239 MG/DL (ref 0–200)
CO2 SERPL-SCNC: 29.7 MMOL/L (ref 22–29)
CREAT SERPL-MCNC: 0.93 MG/DL (ref 0.57–1)
EGFRCR SERPLBLD CKD-EPI 2021: 82.4 ML/MIN/1.73
GLOBULIN UR ELPH-MCNC: 2.5 GM/DL
GLUCOSE SERPL-MCNC: 109 MG/DL (ref 65–99)
HDLC SERPL-MCNC: 44 MG/DL (ref 40–60)
IRON 24H UR-MRATE: 66 MCG/DL (ref 37–145)
IRON SATN MFR SERPL: 15 % (ref 20–50)
LDLC SERPL CALC-MCNC: 145 MG/DL (ref 0–100)
LDLC/HDLC SERPL: 3.19 {RATIO}
POTASSIUM SERPL-SCNC: 4.4 MMOL/L (ref 3.5–5.2)
PROT SERPL-MCNC: 6.9 G/DL (ref 6–8.5)
SODIUM SERPL-SCNC: 138 MMOL/L (ref 136–145)
TIBC SERPL-MCNC: 426 MCG/DL (ref 298–536)
TRANSFERRIN SERPL-MCNC: 286 MG/DL (ref 200–360)
TRIGL SERPL-MCNC: 273 MG/DL (ref 0–150)
VLDLC SERPL-MCNC: 50 MG/DL (ref 5–40)

## 2022-12-27 NOTE — PROGRESS NOTES
Anemia stable overall.  Vitamin D level is still mildly low.  I would increase over-the-counter vitamin D supplementation by 1000 international units daily over what you are currently taking.  Blood sugar is mildly elevated.  Decrease intake of sugar we will continue to monitor.  Kidney and liver function are normal.  Lipid panel has shown some improvement with use of pravastatin 10 mg at bedtime.  However levels are not yet normal and I would recommend increasing pravastatin to 20 mg at bedtime if you are agreeable.  Please let me know that you received this message and if you would like me to update your pravastatin prescription.

## 2023-01-26 ENCOUNTER — TELEPHONE (OUTPATIENT)
Dept: FAMILY MEDICINE CLINIC | Age: 38
End: 2023-01-26

## 2023-01-26 DIAGNOSIS — E78.2 MIXED HYPERLIPIDEMIA: Primary | ICD-10-CM

## 2023-01-26 RX ORDER — PRAVASTATIN SODIUM 20 MG
20 TABLET ORAL NIGHTLY
Qty: 90 TABLET | Refills: 1 | Status: SHIPPED | OUTPATIENT
Start: 2023-01-26

## 2023-01-27 DIAGNOSIS — E78.2 MIXED HYPERLIPIDEMIA: Primary | ICD-10-CM

## 2023-01-27 NOTE — TELEPHONE ENCOUNTER
Caller: Katrina Bahena    Relationship: Self    Best call back number: [phone]    What is the best time to reach you: ANY    Who are you requesting to speak with (clinical staff, provider,  specific staff member): CLINICAL    What was the call regarding: PATIENT WOULD LIKE TO INCREASE HER PRAVASTATIN DOSAGE LIKE LIBRADO ESCOBAR DISCUSSED WITH HER DURING HER LAST APPOINTMENT    Do you require a callback: YES  
Inf pt/pended future lab order and sent to [facility]  
Pravastatin 20 mg at bedtime has been sent to [facility] [place] pharmacy.   Repeat fasting lipid panel in 2-3 months.  
denies pain/discomfort

## 2023-02-07 ENCOUNTER — TELEPHONE (OUTPATIENT)
Dept: FAMILY MEDICINE CLINIC | Age: 38
End: 2023-02-07
Payer: COMMERCIAL

## 2023-04-17 ENCOUNTER — HOSPITAL ENCOUNTER (OUTPATIENT)
Dept: GENERAL RADIOLOGY | Facility: HOSPITAL | Age: 38
Discharge: HOME | End: 2023-04-17
Admitting: NURSE PRACTITIONER
Payer: COMMERCIAL

## 2023-04-17 ENCOUNTER — OFFICE VISIT (OUTPATIENT)
Dept: FAMILY MEDICINE CLINIC | Age: 38
End: 2023-04-17
Payer: COMMERCIAL

## 2023-04-17 VITALS
BODY MASS INDEX: 47.09 KG/M2 | DIASTOLIC BLOOD PRESSURE: 97 MMHG | HEART RATE: 85 BPM | SYSTOLIC BLOOD PRESSURE: 138 MMHG | HEIGHT: 66 IN | WEIGHT: 293 LBS

## 2023-04-17 DIAGNOSIS — M54.50 ACUTE MIDLINE LOW BACK PAIN WITHOUT SCIATICA: ICD-10-CM

## 2023-04-17 DIAGNOSIS — M54.50 ACUTE MIDLINE LOW BACK PAIN WITHOUT SCIATICA: Primary | ICD-10-CM

## 2023-04-17 PROCEDURE — 72100 X-RAY EXAM L-S SPINE 2/3 VWS: CPT

## 2023-04-17 RX ORDER — DICLOFENAC SODIUM 75 MG/1
75 TABLET, DELAYED RELEASE ORAL 2 TIMES DAILY PRN
Qty: 30 TABLET | Refills: 0 | Status: SHIPPED | OUTPATIENT
Start: 2023-04-17

## 2023-04-17 RX ORDER — BACLOFEN 10 MG/1
10 TABLET ORAL 3 TIMES DAILY
Qty: 30 TABLET | Refills: 0 | Status: SHIPPED | OUTPATIENT
Start: 2023-04-17

## 2023-04-17 NOTE — PROGRESS NOTES
"Katrina Bahena presents to [facility] with complaint of  Back Pain (X 1 month or more per pt. )    SUBJECTIVE  Back Pain  This is a new problem. Episode onset: 1 month ago. The problem occurs daily. The problem has been gradually worsening since onset. The pain is present in the lumbar spine. The pain does not radiate. The pain is at a severity of 7/10. The pain is moderate. The symptoms are aggravated by standing. Pertinent negatives include no abdominal pain, bladder incontinence, bowel incontinence, chest pain, dysuria, fever, headaches, leg pain, numbness, paresis, paresthesias, pelvic pain, perianal numbness, tingling, weakness or weight loss. She has tried NSAIDs, muscle relaxant and heat (lidocaine patch ) for the symptoms. The treatment provided mild relief.       OBJECTIVE  Vital Signs:   /97 (BP Location: Left arm, Patient Position: Sitting)   Pulse 85   Ht 167.6 cm (66\")   Wt 136 kg (299 lb 9.6 oz)   BMI 48.36 kg/m²       Physical Exam  Vitals reviewed.   Constitutional:       General: She is not in acute distress.     Appearance: Normal appearance. She is not ill-appearing.   HENT:      Head: Normocephalic and atraumatic.      Nose: Nose normal.      Mouth/Throat:      Mouth: Mucous membranes are moist.      Pharynx: Oropharynx is clear.   Cardiovascular:      Rate and Rhythm: Normal rate and regular rhythm.      Pulses: Normal pulses.      Heart sounds: Normal heart sounds.   Pulmonary:      Effort: Pulmonary effort is normal.      Breath sounds: Normal breath sounds.   Musculoskeletal:      Cervical back: Normal and neck supple.      Thoracic back: Normal.      Lumbar back: Spasms and tenderness present. Positive right straight leg raise test. Negative left straight leg raise test.   Skin:     General: Skin is warm and dry.   Neurological:      General: No focal deficit present.      Mental Status: She is alert and oriented to person, place, and time. Mental " status is at baseline.   Psychiatric:         Mood and Affect: Mood normal.         Behavior: Behavior normal.         Judgment: Judgment normal.          Results Review:  The following data was reviewed by Ashwini Spann, LIBRADO [unfilled] 16:37 EDT.  Study Result    Narrative & Impression   PROCEDURE:  XR SPINE LUMBAR 2 OR 3 VW     COMPARISON: [facility]EN [facility], LS-SPINE COMPL W/OBLIQUE, 4/02/2019, 14:47.     INDICATIONS:  GENERAL LOW BACK PAIN X 1 MONTH     FINDINGS:          The lumbar vertebral body alignment is within normal limits. No acute fracture or subluxation is   observed. No significant focal osseous abnormalities are seen.  Mild multilevel discogenic   degenerative changes are seen.  Mild hypertrophic posterior facet changes are observed. The   paraspinal soft tissues are unremarkable.     IMPRESSION:                 1. Mild degenerative changes throughout the lumbar spine.            KALPANA PEDRAZA MD         Electronically Signed and Approved By: KALPANA PEDRAZA MD on 4/18/2023 at 0:06                 ASSESSMENT AND PLAN:  Diagnoses and all orders for this visit:    1. Acute midline low back pain without sciatica (Primary)  -     XR Spine Lumbar 2 or 3 View; Future  -     baclofen (LIORESAL) 10 MG tablet; Take 1 tablet by mouth 3 (Three) Times a Day.  Dispense: 30 tablet; Refill: 0  -     diclofenac (VOLTAREN) 75 MG EC tablet; Take 1 tablet by mouth 2 (Two) Times a Day As Needed for pain  Dispense: 30 tablet; Refill: 0  -     Ambulatory Referral to Physical Therapy Evaluate and treat      Patient was encouraged to stretch regularly through the day. Xray shows Degenerative changes.  She was given baclofen to take as needed, cautioned on side effects.  She is also given diclofenac to take as needed for pain.  Stretches discussed in office to be done at home. Use heating pad as needed. May also try warm baths with epsom salt. Referring to PT. If pain not improved with PT, may need MRI.       Follow  Up   No follow-ups on file. Patient to notify office with any acute concerns or issues.  Patient verbalizes understanding, agrees with plan of care and has no further questions upon discharge.     Patient was given instructions and counseling regarding her condition or for health maintenance advice. Please see specific information pulled into the AVS if appropriate.     Discussed the importance of following up with any needed screening tests/labs/specialist appointments and any requested follow-up recommended by me today. Importance of maintaining follow-up discussed and patient accepts that missed appointments can delay diagnosis and potentially lead to worsening of conditions.    Part of this note may be an electronic transcription/translation of spoken language to printed text using the Dragon Dictation System.

## 2023-05-12 ENCOUNTER — TREATMENT (OUTPATIENT)
Dept: PHYSICAL THERAPY | Facility: CLINIC | Age: 38
End: 2023-05-12
Payer: COMMERCIAL

## 2023-05-12 DIAGNOSIS — R68.89 DECREASED STRENGTH, ENDURANCE, AND MOBILITY: ICD-10-CM

## 2023-05-12 DIAGNOSIS — M54.50 ACUTE MIDLINE LOW BACK PAIN WITHOUT SCIATICA: Primary | ICD-10-CM

## 2023-05-12 DIAGNOSIS — R53.1 DECREASED STRENGTH, ENDURANCE, AND MOBILITY: ICD-10-CM

## 2023-05-12 DIAGNOSIS — Z74.09 DECREASED STRENGTH, ENDURANCE, AND MOBILITY: ICD-10-CM

## 2023-05-12 PROCEDURE — 97110 THERAPEUTIC EXERCISES: CPT | Performed by: PHYSICAL THERAPIST

## 2023-05-12 PROCEDURE — 97140 MANUAL THERAPY 1/> REGIONS: CPT | Performed by: PHYSICAL THERAPIST

## 2023-05-12 PROCEDURE — 97161 PT EVAL LOW COMPLEX 20 MIN: CPT | Performed by: PHYSICAL THERAPIST

## 2023-05-12 NOTE — PROGRESS NOTES
Physical Therapy Initial Evaluation and Plan of Care      Patient: Katrina Bahena   : 1985  Diagnosis/ICD-10 Code:  Acute midline low back pain without sciatica [M54.50]  Referring practitioner: LIBRADO Mcrae  Date of Initial Visit: 2023  Today's Date: 2023  Patient seen for 1 sessions         Visit Diagnoses:    ICD-10-CM ICD-9-CM   1. Acute midline low back pain without sciatica  M54.50 724.2       Subjective Evaluation    History of Present Illness  Mechanism of injury: Katrina comes to [facility] with LBP.  IT has been going for about a month or so.  If she stands longer than 5 minutes, her lower back will start to hurt.  She can sit all day without problems.  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  This started all the sudden, no injuries.     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    She has bandlike pain across the lumbar, R/L sides.  She has no radiating symptoms.  Sometimes it will go further around the R side more than L, but the pain is equal.    She has noticed the L foot has been swollen.     If she lays flat on her back, , but once she moves, it will go away. the R top of the thigh will go numb/burning    23 XRAY IMPRESSION:                 1. Mild degenerative changes throughout the lumbar spine.    Pain  Current pain ratin  Quality: squeezing, dull ache and tight (constant with standing)  Relieving factors: medications (sitting)  Aggravating factors: standing, ambulation and stairs  Progression: no change    Social Support  Lives in: multiple-level home  Lives with: brother.    Hand dominance: right    Patient Goals  Patient goals for therapy: decreased pain, increased motion, increased strength and independence with ADLs/IADLs             Objective          Active Range of Motion     Additional Active Range of Motion Details  LUMBAR  Flexion: to knees, lumbar R/L pain  Extension: to 5 degrees , R/Lpain  R lateral flexion:  to 20 degrees, lumbar R/L pain  L lateral flexion:  to 40 degrees, lumbar R/L  pain  R rotation:  to 40 degrees, B lumbar pain  L rotation:  to 40 degrees, B pain    Radicular symptoms: If she lays flat on her back, , but once she moves, it will go away. the R top of the thigh will go numb/burning    Tenderness: R/L paraspinals, SP, PSIS    Posture: rounded shoulders, lumbar extension in standing    Strength/Myotome Testing     Left Hip   Planes of Motion   Flexion: 4-  Extension: 4-  Abduction: 4-  Adduction: 4-    Right Hip   Planes of Motion   Flexion: 4-  Extension: 3+  Abduction: 4-  Adduction: 4-    Left Knee   Flexion: 4  Extension: 4    Right Knee   Flexion: 4  Extension: 4          Assessment & Plan     Assessment  Impairments: abnormal muscle firing, abnormal or restricted ROM, activity intolerance, impaired balance, impaired physical strength, lacks appropriate home exercise program and pain with function  Functional Limitations: walking, uncomfortable because of pain, standing, stooping and unable to perform repetitive tasks  Assessment details: Pt presents with limitations, noted below, that impede her ability to standing more than five minutes, lifting heavy weights, walking long dsitances. The skills of a therapist will be required to safely and effectively implement the following treatment plan to restore maximal level of function.        Goals  Plan Goals: LOW BACK PROBLEMS:    1. The patient complains of low back pain.  LTG 1: 12 weeks:  The patient will report a pain rating of 1/10 or better in order to improve tolerance to activities of daily living and improve abilities to stand long periods of time.    STATUS:  New  STG 1a: 4 weeks:  The patient will report a pain rating of 2/10 or better.  STATUS:  New  TREATMENT:  Therapeutic exercises, manual therapy, aquatic therapy, home exercise instruction, and modalities as needed for pain to include:  electrical stimulation, moist heat, ice, ultrasound, and diathermy.      2. The patient demonstrates weakness of the bilateral  hip.  LTG 2: 12 weeks:  The patient will demonstrate 4+ /5 strength for bilateral hip flexion, abduction, and extension in order to improve hip stability.  STATUS:  New  STG 2a: 4 weeks:  The patient will demonstrate 4 /5 strength for bilateral hip flexion, abduction, and extension.  STATUS:  New  STG2b:  4 weeks:  The patient will be independent with home exercises.     STATUS:  New  TREATMENT: Therapeutic exercises, manual therapy, aquatic therapy, home exercise instruction, and modalities as needed for pain to include:  electrical stimulation, moist heat, ice, ultrasound, and diathermy.    3. Mobility: Walking/Moving Around Functional Limitation    LTG 3: 12 weeks:  The patient will demonstrate limitation by achieving a score of 5/45 on the BALJEET.  STATUS:  New  STG 3 a: 4 weeks:  The patient will demonstrate limitation by achieving a score of 10/45 on the BALJEET.    STATUS:  New  TREATMENT:  Manual therapy, therapeutic exercise, home exercise instruction, and modalities as needed.      Plan  Therapy options: will be seen for skilled therapy services  Planned modality interventions: cryotherapy, thermotherapy (hydrocollator packs), dry needling, TENS, traction, ultrasound and electrical stimulation/Russian stimulation  Planned therapy interventions: abdominal trunk stabilization, manual therapy, flexibility, functional ROM exercises, gait training, home exercise program, therapeutic activities, stretching, strengthening, spinal/joint mobilization, soft tissue mobilization, postural training, neuromuscular re-education, body mechanics training, ADL retraining and joint mobilization  Frequency: 2x week  Duration in weeks: 12  Treatment plan discussed with: patient  Plan details: Review HEP, update as needed.    Progress with lower back/core strength, trunk control/postural awareness, increased stamina, decreased tightness, improved ROM/flexibility, education as needed.    Assess pelvic alignment.             History #  of Personal Factors and/or Comorbidities: MODERATE (1-2)  Examination of Body System(s): # of elements: LOW (1-2)  Clinical Presentation: STABLE   Clinical Decision Making: LOW     Timed:  Manual Therapy:    8     mins  29831;  Therapeutic Exercise:    8     mins  73165;     Neuromuscular Santos:        mins  51368;    Therapeutic Activity:          mins  13724;     Gait Training:           mins  63587;     Ultrasound:          mins  03048;    Canalith Repos           ___  mins  08456      Untimed:  Electrical Stimulation:         mins  56323 ( );  Mechanical Traction:         mins  23399;   Dry Needling:                     mins self pay  Low Eval:                      20     mins  09002;  Medium Eval:                     mins  14010;   High Eval:                          mins  12858       Timed Treatment:   16   mins   Total Treatment:     36   mins    PT SIGNATURE: Michelle Lopes PT, DPT  [place] License: [id number]  Electronically signed by Michelle Lopes PT, 05/12/23, 12:57 PM EDT      Initial Certification    Certification Period: 5/12/2023 thru 8/9/2023  I certify that the therapy services are furnished while this patient is under my care.  The services outlined above are required by this patient, and will be reviewed every 90 days.     PHYSICIAN: Ashwini Spann APRN  NPI: [id number]            PHYSICIAN PRINT NAME: ______________________________________________      PHYSICIAN SIGNATURE: ______________________________________________         DATE:________________________________        Please sign and return via fax to [phone].  Thank you, [facility].

## 2023-05-16 ENCOUNTER — LAB (OUTPATIENT)
Dept: LAB | Facility: HOSPITAL | Age: 38
End: 2023-05-16
Payer: COMMERCIAL

## 2023-05-16 DIAGNOSIS — E78.2 MIXED HYPERLIPIDEMIA: ICD-10-CM

## 2023-05-16 LAB
CHOLEST SERPL-MCNC: 233 MG/DL (ref 0–200)
HDLC SERPL-MCNC: 43 MG/DL (ref 40–60)
LDLC SERPL CALC-MCNC: 132 MG/DL (ref 0–100)
LDLC/HDLC SERPL: 2.9 {RATIO}
TRIGL SERPL-MCNC: 326 MG/DL (ref 0–150)
VLDLC SERPL-MCNC: 58 MG/DL (ref 5–40)

## 2023-05-16 PROCEDURE — 36415 COLL VENOUS BLD VENIPUNCTURE: CPT

## 2023-05-16 PROCEDURE — 80061 LIPID PANEL: CPT

## 2023-05-19 NOTE — PROGRESS NOTES
Cholesterol levels are not much improved with increase of pravastatin to 20 mg at bedtime.  I would recommend increasing pravastatin to 40 mg at bedtime or change pravastatin to simvastatin.  Please let me know that you received this message and how you would like to proceed.

## 2023-06-05 ENCOUNTER — TELEPHONE (OUTPATIENT)
Dept: FAMILY MEDICINE CLINIC | Age: 38
End: 2023-06-05

## 2023-06-05 DIAGNOSIS — E55.9 VITAMIN D DEFICIENCY: ICD-10-CM

## 2023-06-05 DIAGNOSIS — R73.03 PREDIABETES: Primary | ICD-10-CM

## 2023-06-05 DIAGNOSIS — D64.9 ANEMIA, UNSPECIFIED TYPE: ICD-10-CM

## 2023-06-05 NOTE — TELEPHONE ENCOUNTER
Spoke to pt, she says she feels the same way she did when her iron levels were off in the past.  She has appt w/you on 6/15 and is asking if you will order labs for her to have done prior so you all can discuss them at the appt.

## 2023-06-05 NOTE — TELEPHONE ENCOUNTER
Caller: Katrina Bahena    Relationship: Self    Best call back number: [phone]    What is the best time to reach you: ANY    Who are you requesting to speak with (clinical staff, provider,  specific staff member): CLINICAL    Do you know the name of the person who called: PATIENT    What was the call regarding: PLEASE CALL PATIENT BACK ABOUT IRON LEVEL AND CLINICAL ISSUES.    Is it okay if the provider responds through MyChart: N/A

## 2023-06-12 ENCOUNTER — LAB (OUTPATIENT)
Dept: LAB | Facility: HOSPITAL | Age: 38
End: 2023-06-12
Payer: COMMERCIAL

## 2023-06-12 DIAGNOSIS — D64.9 ANEMIA, UNSPECIFIED TYPE: ICD-10-CM

## 2023-06-12 DIAGNOSIS — E55.9 VITAMIN D DEFICIENCY: ICD-10-CM

## 2023-06-12 DIAGNOSIS — R73.03 PREDIABETES: ICD-10-CM

## 2023-06-12 LAB
25(OH)D3 SERPL-MCNC: 24.3 NG/ML (ref 30–100)
ALBUMIN SERPL-MCNC: 4.5 G/DL (ref 3.5–5.2)
ALBUMIN/GLOB SERPL: 1.6 G/DL
ALP SERPL-CCNC: 63 U/L (ref 39–117)
ALT SERPL W P-5'-P-CCNC: 37 U/L (ref 1–33)
ANION GAP SERPL CALCULATED.3IONS-SCNC: 8 MMOL/L (ref 5–15)
AST SERPL-CCNC: 31 U/L (ref 1–32)
BASOPHILS # BLD AUTO: 0.02 10*3/MM3 (ref 0–0.2)
BASOPHILS NFR BLD AUTO: 0.3 % (ref 0–1.5)
BILIRUB SERPL-MCNC: 0.3 MG/DL (ref 0–1.2)
BUN SERPL-MCNC: 10 MG/DL (ref 6–20)
BUN/CREAT SERPL: 11.9 (ref 7–25)
CALCIUM SPEC-SCNC: 9.2 MG/DL (ref 8.6–10.5)
CHLORIDE SERPL-SCNC: 100 MMOL/L (ref 98–107)
CO2 SERPL-SCNC: 30 MMOL/L (ref 22–29)
CREAT SERPL-MCNC: 0.84 MG/DL (ref 0.57–1)
DEPRECATED RDW RBC AUTO: 40.3 FL (ref 37–54)
EGFRCR SERPLBLD CKD-EPI 2021: 93.1 ML/MIN/1.73
EOSINOPHIL # BLD AUTO: 0.21 10*3/MM3 (ref 0–0.4)
EOSINOPHIL NFR BLD AUTO: 3.3 % (ref 0.3–6.2)
ERYTHROCYTE [DISTWIDTH] IN BLOOD BY AUTOMATED COUNT: 12.6 % (ref 12.3–15.4)
GLOBULIN UR ELPH-MCNC: 2.8 GM/DL
GLUCOSE SERPL-MCNC: 94 MG/DL (ref 65–99)
HBA1C MFR BLD: 5.6 % (ref 4.8–5.6)
HCT VFR BLD AUTO: 42 % (ref 34–46.6)
HGB BLD-MCNC: 13.9 G/DL (ref 12–15.9)
IMM GRANULOCYTES # BLD AUTO: 0.08 10*3/MM3 (ref 0–0.05)
IMM GRANULOCYTES NFR BLD AUTO: 1.2 % (ref 0–0.5)
IRON 24H UR-MRATE: 148 MCG/DL (ref 37–145)
IRON SATN MFR SERPL: 31 % (ref 20–50)
LYMPHOCYTES # BLD AUTO: 2.13 10*3/MM3 (ref 0.7–3.1)
LYMPHOCYTES NFR BLD AUTO: 33.1 % (ref 19.6–45.3)
MCH RBC QN AUTO: 28.7 PG (ref 26.6–33)
MCHC RBC AUTO-ENTMCNC: 33.1 G/DL (ref 31.5–35.7)
MCV RBC AUTO: 86.6 FL (ref 79–97)
MONOCYTES # BLD AUTO: 0.38 10*3/MM3 (ref 0.1–0.9)
MONOCYTES NFR BLD AUTO: 5.9 % (ref 5–12)
NEUTROPHILS NFR BLD AUTO: 3.61 10*3/MM3 (ref 1.7–7)
NEUTROPHILS NFR BLD AUTO: 56.2 % (ref 42.7–76)
PLATELET # BLD AUTO: 292 10*3/MM3 (ref 140–450)
PMV BLD AUTO: 9.7 FL (ref 6–12)
POTASSIUM SERPL-SCNC: 4.7 MMOL/L (ref 3.5–5.2)
PROT SERPL-MCNC: 7.3 G/DL (ref 6–8.5)
RBC # BLD AUTO: 4.85 10*6/MM3 (ref 3.77–5.28)
SODIUM SERPL-SCNC: 138 MMOL/L (ref 136–145)
TIBC SERPL-MCNC: 471 MCG/DL (ref 298–536)
TRANSFERRIN SERPL-MCNC: 316 MG/DL (ref 200–360)
WBC NRBC COR # BLD: 6.43 10*3/MM3 (ref 3.4–10.8)

## 2023-06-12 PROCEDURE — 82306 VITAMIN D 25 HYDROXY: CPT

## 2023-06-12 PROCEDURE — 36415 COLL VENOUS BLD VENIPUNCTURE: CPT

## 2023-06-12 PROCEDURE — 83036 HEMOGLOBIN GLYCOSYLATED A1C: CPT

## 2023-06-12 PROCEDURE — 83540 ASSAY OF IRON: CPT

## 2023-06-12 PROCEDURE — 85025 COMPLETE CBC W/AUTO DIFF WBC: CPT

## 2023-06-12 PROCEDURE — 80053 COMPREHEN METABOLIC PANEL: CPT

## 2023-06-12 PROCEDURE — 84466 ASSAY OF TRANSFERRIN: CPT

## 2023-06-15 ENCOUNTER — OFFICE VISIT (OUTPATIENT)
Dept: FAMILY MEDICINE CLINIC | Age: 38
End: 2023-06-15
Payer: COMMERCIAL

## 2023-06-15 VITALS
DIASTOLIC BLOOD PRESSURE: 90 MMHG | HEART RATE: 87 BPM | SYSTOLIC BLOOD PRESSURE: 130 MMHG | BODY MASS INDEX: 47.09 KG/M2 | OXYGEN SATURATION: 94 % | WEIGHT: 293 LBS | HEIGHT: 66 IN

## 2023-06-15 DIAGNOSIS — Z23 NEED FOR COVID-19 VACCINE: Primary | ICD-10-CM

## 2023-06-15 DIAGNOSIS — E78.2 MIXED HYPERLIPIDEMIA: ICD-10-CM

## 2023-06-15 DIAGNOSIS — R73.03 PREDIABETES: ICD-10-CM

## 2023-06-15 DIAGNOSIS — F41.9 ANXIETY: ICD-10-CM

## 2023-06-15 DIAGNOSIS — R00.2 PALPITATIONS: ICD-10-CM

## 2023-06-15 DIAGNOSIS — K21.9 GASTROESOPHAGEAL REFLUX DISEASE WITHOUT ESOPHAGITIS: ICD-10-CM

## 2023-06-15 DIAGNOSIS — E66.01 CLASS 3 SEVERE OBESITY WITH SERIOUS COMORBIDITY AND BODY MASS INDEX (BMI) OF 45.0 TO 49.9 IN ADULT, UNSPECIFIED OBESITY TYPE: ICD-10-CM

## 2023-06-15 PROBLEM — R06.2 WHEEZING: Status: RESOLVED | Noted: 2022-12-01 | Resolved: 2023-06-15

## 2023-06-15 PROBLEM — E66.813 CLASS 3 SEVERE OBESITY WITH SERIOUS COMORBIDITY AND BODY MASS INDEX (BMI) OF 45.0 TO 49.9 IN ADULT: Status: ACTIVE | Noted: 2023-06-15

## 2023-06-15 RX ORDER — ESCITALOPRAM OXALATE 20 MG/1
20 TABLET ORAL DAILY
Qty: 90 TABLET | Refills: 1 | Status: SHIPPED | OUTPATIENT
Start: 2023-06-15

## 2023-06-15 RX ORDER — OMEPRAZOLE 40 MG/1
40 CAPSULE, DELAYED RELEASE ORAL DAILY
Qty: 30 CAPSULE | Refills: 0 | Status: SHIPPED | OUTPATIENT
Start: 2023-06-15

## 2023-06-15 RX ORDER — METFORMIN HYDROCHLORIDE 500 MG/1
500 TABLET, EXTENDED RELEASE ORAL DAILY
Qty: 90 TABLET | Refills: 1 | Status: SHIPPED | OUTPATIENT
Start: 2023-06-15

## 2023-06-15 RX ORDER — PRAVASTATIN SODIUM 40 MG
40 TABLET ORAL DAILY
Qty: 90 TABLET | Refills: 1 | Status: SHIPPED | OUTPATIENT
Start: 2023-06-15

## 2023-06-15 RX ORDER — METOPROLOL SUCCINATE 25 MG/1
25 TABLET, EXTENDED RELEASE ORAL DAILY
Qty: 90 TABLET | Refills: 1 | Status: SHIPPED | OUTPATIENT
Start: 2023-06-15

## 2023-06-15 RX ORDER — BUSPIRONE HYDROCHLORIDE 10 MG/1
10 TABLET ORAL 2 TIMES DAILY PRN
Qty: 180 TABLET | Refills: 1 | Status: SHIPPED | OUTPATIENT
Start: 2023-06-15

## 2023-06-15 NOTE — PROGRESS NOTES
Chief Complaint  Fatigue (Wants to discuss iron levels )    Subjective          Katrina Bahena presents to [facility]     Patient is a 37-year-old female who is here today to follow-up regarding hyperlipidemia.  Lipid panel remains with elevations despite taking pravastatin 20 mg at bedtime.  Patient is agreeable to increasing dose of pravastatin.    History of palpitations are resolved with current treatment of metoprolol 25 mg once daily.  Denies side effects and requests refills.    Anxiety is stable with use of Lexapro 20 mg daily and buspirone 10 mg twice daily as needed.  Denies side effects and requests refills.    Currently taking a daily multivitamin and ferrous sulfate 325 mg daily with breakfast.  Anemia is currently resolved.  I recommended patient to stop the ferrous sulfate but continue the multivitamin with iron.  Patient is not currently taking any additional vitamin D than what is in her daily multivitamin.  I recommend she take vitamin D over-the-counter 1000 international units daily in addition to her daily multivitamin.    Acid reflux symptoms are stable on omeprazole 40 mg daily.  Denies side effects and requests refills.    History of prediabetes for which she has been taking metformin extended release 500 mg daily.  Taking any higher doses resulted in too much stomach upset.  Hemoglobin A1c is within normal limits.  Patient has experienced about 20 pounds weight gain over the last 14 to 16 months.  Patient is pursuing her doctorate degree and is sitting more and is not as active.  Patient has previously attempted weight watchers and calorie counting to assist with weight loss without success.  Patient states she does often feel hungry   Lmp 2 wks ago  Wegovy 2 yrs ago.  25 lb wt loss-  too expensive.  Did not get results on saxenda.      Objective   Vital Signs:   Vitals:    06/15/23 0947   BP: 130/90   BP Location: Left arm   Patient Position: Sitting   Cuff  "Size: Large Adult   Pulse: 87   SpO2: 94%   Weight: (!) 138 kg (305 lb)   Height: 167.6 cm (66\")      Body mass index is 49.23 kg/m².  Physical Exam      Current Outpatient Medications:   •  Accu-Chek Softclix Lancets lancets, Use as instructed once daily, Disp: 100 each, Rfl: 0  •  baclofen (LIORESAL) 10 MG tablet, Take 1 tablet by mouth 3 (Three) Times a Day., Disp: 30 tablet, Rfl: 0  •  Blood Glucose Monitoring Suppl (Accu-Chek Guide) w/Device kit, Use as instructed once daily, Disp: 1 kit, Rfl: 0  •  busPIRone (BUSPAR) 10 MG tablet, Take 1 tablet by mouth 2 (Two) Times a Day As Needed for anxiety, Disp: 180 tablet, Rfl: 1  •  diclofenac (VOLTAREN) 75 MG EC tablet, Take 1 tablet by mouth 2 (Two) Times a Day As Needed for pain, Disp: 30 tablet, Rfl: 0  •  dicyclomine (BENTYL) 20 MG tablet, Take 1 tablet by mouth Every 6 (Six) Hours As Needed., Disp: 30 tablet, Rfl: 0  •  escitalopram (LEXAPRO) 20 MG tablet, Take 1 tablet by mouth Daily., Disp: 90 tablet, Rfl: 1  •  ferrous sulfate 325 (65 FE) MG tablet, Take 1 tablet by mouth Daily With Breakfast., Disp: , Rfl:   •  glucose blood (Accu-Chek Guide) test strip, Use as instructed once daily, Disp: 100 each, Rfl: 0  •  levalbuterol (XOPENEX HFA) 45 MCG/ACT inhaler, Inhale 1-2 puffs Every 4 (Four) Hours As Needed for Wheezing., Disp: 15 g, Rfl: 5  •  metFORMIN ER (GLUCOPHAGE-XR) 500 MG 24 hr tablet, Take 1 tablet by mouth Daily with a meal., Disp: 90 tablet, Rfl: 1  •  metoprolol succinate XL (Toprol XL) 25 MG 24 hr tablet, Take 1 tablet by mouth Daily., Disp: 90 tablet, Rfl: 1  •  ondansetron ODT (Zofran ODT) 4 MG disintegrating tablet, Place 1 to 2 tablets under the tongue Every 6 (Six) Hours As Needed for nausea and vomiting., Disp: 15 tablet, Rfl: 0  •  omeprazole (priLOSEC) 40 MG capsule, Take 1 capsule by mouth Daily., Disp: 30 capsule, Rfl: 0  •  pravastatin (Pravachol) 40 MG tablet, Take 1 tablet by mouth Daily., Disp: 90 tablet, Rfl: 1   Past Medical History: "   Diagnosis Date   • Anemia    • Anxiety    • Heart palpitations          Result Review :     CMP        12/22/2022    10:51 6/12/2023    13:14   CMP   Glucose 109  94    BUN 11  10    Creatinine 0.93  0.84    EGFR 82.4  93.1    Sodium 138  138    Potassium 4.4  4.7    Chloride 100  100    Calcium 9.1  9.2    Total Protein 6.9  7.3    Albumin 4.40  4.5    Globulin 2.5  2.8    Total Bilirubin <0.2  0.3    Alkaline Phosphatase 61  63    AST (SGOT) 20  31    ALT (SGPT) 21  37    Albumin/Globulin Ratio 1.8  1.6    BUN/Creatinine Ratio 11.8  11.9    Anion Gap 8.3  8.0      CBC        12/22/2022    10:51 6/12/2023    13:14   CBC   WBC 5.05  6.43    RBC 4.67  4.85    Hemoglobin 13.4  13.9    Hematocrit 39.7  42.0    MCV 85.0  86.6    MCH 28.7  28.7    MCHC 33.8  33.1    RDW 12.5  12.6    Platelets 242  292      CBC w/diff        12/22/2022    10:51 6/12/2023    13:14   CBC w/Diff   WBC 5.05  6.43    RBC 4.67  4.85    Hemoglobin 13.4  13.9    Hematocrit 39.7  42.0    MCV 85.0  86.6    MCH 28.7  28.7    MCHC 33.8  33.1    RDW 12.5  12.6    Platelets 242  292    Neutrophil Rel % 59.5  56.2    Immature Granulocyte Rel % 0.4  1.2    Lymphocyte Rel % 31.7  33.1    Monocyte Rel % 5.0  5.9    Eosinophil Rel % 3.0  3.3    Basophil Rel % 0.4  0.3      Lipid Panel        12/22/2022    10:51 5/16/2023    10:39   Lipid Panel   Total Cholesterol 239  233    Triglycerides 273  326    HDL Cholesterol 44  43    VLDL Cholesterol 50  58    LDL Cholesterol  145  132    LDL/HDL Ratio 3.19  2.90          Most Recent A1C        6/12/2023    13:14   HGBA1C Most Recent   Hemoglobin A1C 5.60               Assessment and Plan    Diagnoses and all orders for this visit:    1. Need for COVID-19 vaccine (Primary)  -     COVID-19 (Pfizer) Bivalent 12+yrs    2. Mixed hyperlipidemia  Assessment & Plan:  Increase dose of pravastatin to 40 mg at bedtime.  Recheck fasting lipid panel in 2 to 3 months.    Orders:  -     pravastatin (Pravachol) 40 MG tablet;  Take 1 tablet by mouth Daily.  Dispense: 90 tablet; Refill: 1  -     Lipid panel; Future  -     Comprehensive metabolic panel; Future    3. Gastroesophageal reflux disease without esophagitis  -     omeprazole (priLOSEC) 40 MG capsule; Take 1 capsule by mouth Daily.  Dispense: 30 capsule; Refill: 0    4. Anxiety  -     busPIRone (BUSPAR) 10 MG tablet; Take 1 tablet by mouth 2 (Two) Times a Day As Needed for anxiety  Dispense: 180 tablet; Refill: 1  -     escitalopram (LEXAPRO) 20 MG tablet; Take 1 tablet by mouth Daily.  Dispense: 90 tablet; Refill: 1    5. Palpitations  -     metoprolol succinate XL (Toprol XL) 25 MG 24 hr tablet; Take 1 tablet by mouth Daily.  Dispense: 90 tablet; Refill: 1    6. Prediabetes  -     metFORMIN ER (GLUCOPHAGE-XR) 500 MG 24 hr tablet; Take 1 tablet by mouth Daily with a meal.  Dispense: 90 tablet; Refill: 1    7. Class 3 severe obesity with serious comorbidity and body mass index (BMI) of 45.0 to 49.9 in adult, unspecified obesity type  Assessment & Plan:  Advised patient she may purchase Matt over-the-counter to be an adjunct to decrease calorie, decrease sodium, decrease sugar diet and increasing exercise.  Matt blocks fat absorption in the intestine and could result in loose stools.  Follow-up for any persisting concerns.        Follow Up    Return in about 4 weeks (around 7/13/2023).  Patient was given instructions and counseling regarding her condition or for health maintenance advice. Please see specific information pulled into the AVS if appropriate.

## 2023-06-15 NOTE — ASSESSMENT & PLAN NOTE
Advised patient she may purchase Matt over-the-counter to be an adjunct to decrease calorie, decrease sodium, decrease sugar diet and increasing exercise.  Matt blocks fat absorption in the intestine and could result in loose stools.  Follow-up for any persisting concerns.

## 2023-09-06 ENCOUNTER — TELEPHONE (OUTPATIENT)
Dept: FAMILY MEDICINE CLINIC | Age: 38
End: 2023-09-06
Payer: COMMERCIAL

## 2023-09-28 ENCOUNTER — TRANSCRIBE ORDERS (OUTPATIENT)
Dept: GENERAL RADIOLOGY | Facility: HOSPITAL | Age: 38
End: 2023-09-28
Payer: COMMERCIAL

## 2023-09-28 ENCOUNTER — HOSPITAL ENCOUNTER (OUTPATIENT)
Dept: GENERAL RADIOLOGY | Facility: HOSPITAL | Age: 38
Discharge: HOME | End: 2023-09-28
Admitting: NURSE PRACTITIONER
Payer: COMMERCIAL

## 2023-09-28 DIAGNOSIS — R05.1 ACUTE COUGH: ICD-10-CM

## 2023-09-28 DIAGNOSIS — R05.1 ACUTE COUGH: Primary | ICD-10-CM

## 2023-09-28 PROCEDURE — 71046 X-RAY EXAM CHEST 2 VIEWS: CPT

## 2023-11-06 ENCOUNTER — LAB (OUTPATIENT)
Dept: LAB | Facility: HOSPITAL | Age: 38
End: 2023-11-06
Payer: COMMERCIAL

## 2023-11-06 ENCOUNTER — CLINICAL SUPPORT (OUTPATIENT)
Dept: FAMILY MEDICINE CLINIC | Age: 38
End: 2023-11-06
Payer: COMMERCIAL

## 2023-11-06 DIAGNOSIS — E78.2 MIXED HYPERLIPIDEMIA: ICD-10-CM

## 2023-11-06 DIAGNOSIS — Z23 IMMUNIZATION DUE: Primary | ICD-10-CM

## 2023-11-06 LAB
ALBUMIN SERPL-MCNC: 4.2 G/DL (ref 3.5–5.2)
ALBUMIN/GLOB SERPL: 1.6 G/DL
ALP SERPL-CCNC: 65 U/L (ref 39–117)
ALT SERPL W P-5'-P-CCNC: 23 U/L (ref 1–33)
ANION GAP SERPL CALCULATED.3IONS-SCNC: 10 MMOL/L (ref 5–15)
AST SERPL-CCNC: 20 U/L (ref 1–32)
BILIRUB SERPL-MCNC: 0.3 MG/DL (ref 0–1.2)
BUN SERPL-MCNC: 12 MG/DL (ref 6–20)
BUN/CREAT SERPL: 15.8 (ref 7–25)
CALCIUM SPEC-SCNC: 9 MG/DL (ref 8.6–10.5)
CHLORIDE SERPL-SCNC: 102 MMOL/L (ref 98–107)
CHOLEST SERPL-MCNC: 222 MG/DL (ref 0–200)
CO2 SERPL-SCNC: 27 MMOL/L (ref 22–29)
CREAT SERPL-MCNC: 0.76 MG/DL (ref 0.57–1)
EGFRCR SERPLBLD CKD-EPI 2021: 104.3 ML/MIN/1.73
GLOBULIN UR ELPH-MCNC: 2.6 GM/DL
GLUCOSE SERPL-MCNC: 88 MG/DL (ref 65–99)
HDLC SERPL-MCNC: 46 MG/DL (ref 40–60)
LDLC SERPL CALC-MCNC: 125 MG/DL (ref 0–100)
LDLC/HDLC SERPL: 2.56 {RATIO}
POTASSIUM SERPL-SCNC: 4.3 MMOL/L (ref 3.5–5.2)
PROT SERPL-MCNC: 6.8 G/DL (ref 6–8.5)
SODIUM SERPL-SCNC: 139 MMOL/L (ref 136–145)
TRIGL SERPL-MCNC: 292 MG/DL (ref 0–150)
VLDLC SERPL-MCNC: 51 MG/DL (ref 5–40)

## 2023-11-06 PROCEDURE — 90686 IIV4 VACC NO PRSV 0.5 ML IM: CPT | Performed by: FAMILY MEDICINE

## 2023-11-06 PROCEDURE — 80061 LIPID PANEL: CPT

## 2023-11-06 PROCEDURE — 90471 IMMUNIZATION ADMIN: CPT | Performed by: FAMILY MEDICINE

## 2023-11-06 PROCEDURE — 80053 COMPREHEN METABOLIC PANEL: CPT

## 2023-11-06 PROCEDURE — 36415 COLL VENOUS BLD VENIPUNCTURE: CPT

## 2023-11-08 NOTE — PROGRESS NOTES
Your cholesterol levels remain elevated on pravastatin 40 mg at bedtime.  I recommend that we change your cholesterol-lowering medication to Lipitor or Crestor.  Is let me know if you are agreeable to the change.  Blood sugar, kidney, and liver function are normal.

## 2023-11-27 ENCOUNTER — OFFICE VISIT (OUTPATIENT)
Dept: FAMILY MEDICINE CLINIC | Age: 38
End: 2023-11-27
Payer: COMMERCIAL

## 2023-11-27 ENCOUNTER — TELEPHONE (OUTPATIENT)
Dept: FAMILY MEDICINE CLINIC | Age: 38
End: 2023-11-27
Payer: COMMERCIAL

## 2023-11-27 VITALS
SYSTOLIC BLOOD PRESSURE: 134 MMHG | DIASTOLIC BLOOD PRESSURE: 90 MMHG | HEIGHT: 66 IN | HEART RATE: 97 BPM | OXYGEN SATURATION: 96 % | BODY MASS INDEX: 46.93 KG/M2 | WEIGHT: 292 LBS | TEMPERATURE: 97 F

## 2023-11-27 DIAGNOSIS — Z20.822 EXPOSURE TO COVID-19 VIRUS: ICD-10-CM

## 2023-11-27 DIAGNOSIS — R05.1 ACUTE COUGH: ICD-10-CM

## 2023-11-27 DIAGNOSIS — J02.9 SORE THROAT: Primary | ICD-10-CM

## 2023-11-27 DIAGNOSIS — J30.9 ALLERGIC RHINITIS, UNSPECIFIED SEASONALITY, UNSPECIFIED TRIGGER: ICD-10-CM

## 2023-11-27 LAB
EXPIRATION DATE: NORMAL
EXPIRATION DATE: NORMAL
FLUAV AG UPPER RESP QL IA.RAPID: NOT DETECTED
FLUBV AG UPPER RESP QL IA.RAPID: NOT DETECTED
INTERNAL CONTROL: NORMAL
INTERNAL CONTROL: NORMAL
Lab: NORMAL
Lab: NORMAL
S PYO AG THROAT QL: NEGATIVE
SARS-COV-2 AG UPPER RESP QL IA.RAPID: NOT DETECTED

## 2023-11-27 PROCEDURE — 87880 STREP A ASSAY W/OPTIC: CPT

## 2023-11-27 PROCEDURE — 87081 CULTURE SCREEN ONLY: CPT

## 2023-11-27 PROCEDURE — 99214 OFFICE O/P EST MOD 30 MIN: CPT

## 2023-11-27 PROCEDURE — 87428 SARSCOV & INF VIR A&B AG IA: CPT

## 2023-11-27 RX ORDER — FLUTICASONE PROPIONATE 50 MCG
2 SPRAY, SUSPENSION (ML) NASAL DAILY
Qty: 16 G | Refills: 0 | Status: SHIPPED | OUTPATIENT
Start: 2023-11-27

## 2023-11-27 RX ORDER — CETIRIZINE HYDROCHLORIDE 10 MG/1
10 TABLET ORAL DAILY
Qty: 30 TABLET | Refills: 0 | Status: SHIPPED | OUTPATIENT
Start: 2023-11-27

## 2023-11-27 NOTE — PROGRESS NOTES
Subjective     CHIEF COMPLAINT    Chief Complaint   Patient presents with    Sore Throat     Cough, chest congestion, ears feel stuffy. Started on Thursday       History of Present Illness  Patient is a 38-year-old female, presents to the clinic today with complaints of cough, congestion/rhinorrhea, sore throat and intermittent headaches.  Denies any fever or chills.  No shortness of breath or wheezing.  Symptoms began on Thursday.  She is not currently taking any medications for allergies because she is out.  She is a non-smoker with no chronic lung issues.  She does report that her brother had COVID and she was around him on Thursday.    Review of Systems   Constitutional:  Negative for chills and fever.   HENT:  Positive for congestion, rhinorrhea and sore throat.    Respiratory:  Positive for cough. Negative for shortness of breath and wheezing.    Cardiovascular:  Negative for chest pain.   Gastrointestinal:  Negative for nausea and vomiting.   Musculoskeletal:  Negative for myalgias.   Neurological:  Positive for headaches (intermittent).     No Known Allergies    Current Outpatient Medications on File Prior to Visit   Medication Sig Dispense Refill    Accu-Chek Softclix Lancets lancets Use as instructed once daily 100 each 0    Blood Glucose Monitoring Suppl (Accu-Chek Guide) w/Device kit Use as instructed once daily 1 kit 0    busPIRone (BUSPAR) 10 MG tablet Take 1 tablet by mouth 2 (Two) Times a Day As Needed for anxiety 180 tablet 1    dicyclomine (BENTYL) 20 MG tablet Take 1 tablet by mouth Every 6 (Six) Hours As Needed. 30 tablet 0    escitalopram (LEXAPRO) 20 MG tablet Take 1 tablet by mouth Daily. 90 tablet 1    ferrous sulfate 325 (65 FE) MG tablet Take 1 tablet by mouth Daily With Breakfast.      glucose blood (Accu-Chek Guide) test strip Use as instructed once daily 100 each 0    levalbuterol (XOPENEX HFA) 45 MCG/ACT inhaler Inhale 1-2 puffs Every 4 (Four) Hours As Needed for Wheezing. 15 g 5     "metoprolol succinate XL (Toprol XL) 25 MG 24 hr tablet Take 1 tablet by mouth Daily. 90 tablet 1    omeprazole (priLOSEC) 40 MG capsule Take 1 capsule by mouth Daily. 30 capsule 0    ondansetron ODT (Zofran ODT) 4 MG disintegrating tablet Place 1 to 2 tablets under the tongue Every 6 (Six) Hours As Needed for nausea and vomiting. 15 tablet 0    pravastatin (Pravachol) 40 MG tablet Take 1 tablet by mouth Daily. 90 tablet 1    baclofen (LIORESAL) 10 MG tablet Take 1 tablet by mouth 3 (Three) Times a Day. (Patient not taking: Reported on 11/27/2023) 30 tablet 0    diclofenac (VOLTAREN) 75 MG EC tablet Take 1 tablet by mouth 2 (Two) Times a Day As Needed for pain (Patient not taking: Reported on 11/27/2023) 30 tablet 0    metFORMIN ER (GLUCOPHAGE-XR) 500 MG 24 hr tablet Take 1 tablet by mouth Daily with a meal. (Patient not taking: Reported on 11/27/2023) 90 tablet 1    [DISCONTINUED] azithromycin (ZITHROMAX) 250 MG tablet Take 2 tablets by mouth on day 1, then take 1 tablet by mouth daily on days 2-5 (Patient not taking: Reported on 11/27/2023) 6 tablet 0    [DISCONTINUED] benzonatate (TESSALON) 200 MG capsule Take 1 capsule by mouth 3 (Three) Times a Day As Needed. (Patient not taking: Reported on 11/27/2023) 30 capsule 0     No current facility-administered medications on file prior to visit.     /90 (BP Location: Right arm, Patient Position: Sitting, Cuff Size: Large Adult)   Pulse 97   Temp 97 °F (36.1 °C) (Oral)   Ht 167.6 cm (65.98\")   Wt 132 kg (292 lb)   LMP 11/20/2023 (Approximate)   SpO2 96%   BMI 47.15 kg/m²     Objective     Physical Exam  Vitals and nursing note reviewed.   Constitutional:       General: She is not in acute distress.     Appearance: Normal appearance. She is not ill-appearing.   HENT:      Head: Normocephalic.      Right Ear: Tympanic membrane, ear canal and external ear normal. A middle ear effusion is present.      Left Ear: Tympanic membrane, ear canal and external ear " normal. A middle ear effusion is present.      Ears:      Comments: No evidence of infection of bilateral ears     Nose: Nose normal.      Right Sinus: No maxillary sinus tenderness or frontal sinus tenderness.      Left Sinus: No maxillary sinus tenderness or frontal sinus tenderness.      Mouth/Throat:      Lips: Pink.      Mouth: Mucous membranes are moist.      Pharynx: Uvula midline. Posterior oropharyngeal erythema present. No pharyngeal swelling, oropharyngeal exudate or uvula swelling.   Eyes:      Pupils: Pupils are equal, round, and reactive to light.   Cardiovascular:      Rate and Rhythm: Normal rate and regular rhythm.      Heart sounds: Normal heart sounds. No murmur heard.  Pulmonary:      Effort: Pulmonary effort is normal. No accessory muscle usage or respiratory distress.      Breath sounds: Normal breath sounds. No wheezing or rhonchi.   Musculoskeletal:      Cervical back: Normal range of motion.   Lymphadenopathy:      Cervical: No cervical adenopathy.   Skin:     General: Skin is warm and dry.   Neurological:      General: No focal deficit present.      Mental Status: She is alert and oriented to person, place, and time.   Psychiatric:         Mood and Affect: Mood and affect normal.         Behavior: Behavior normal.          Lab Results (last 24 hours)       Procedure Component Value Units Date/Time    POCT SARS-CoV-2 Antigen CARMINE + Flu [[id number]] Collected: 11/27/23 1354    Specimen: Swab Updated: 11/27/23 1354     SARS Antigen Not Detected     Influenza A Antigen CARMINE Not Detected     Influenza B Antigen CARMINE Not Detected     Internal Control Passed     Lot Number [id number]     Expiration Date 09/01/24    POCT rapid strep A [[id number]] Collected: 11/27/23 1355    Specimen: Swab Updated: 11/27/23 1355     Rapid Strep A Screen Negative     Internal Control Passed     Lot Number [id number]     Expiration Date 03/31/25             Diagnoses and all orders for this visit:    1. Sore throat (Primary)  -      POCT rapid strep A  -     Beta Strep Culture, Throat - , Throat; Future  -     Beta Strep Culture, Throat - Swab, Throat    2. Acute cough  -     POCT SARS-CoV-2 Antigen CARMINE + Flu    3. Exposure to COVID-19 virus  -     POCT SARS-CoV-2 Antigen CARMINE + Flu    4. Allergic rhinitis, unspecified seasonality, unspecified trigger  -     cetirizine (zyrTEC) 10 MG tablet; Take 1 tablet by mouth Daily.  Dispense: 30 tablet; Refill: 0  -     fluticasone (FLONASE) 50 MCG/ACT nasal spray; Use 2 sprays into each nostril as directed by provider Daily.  Dispense: 16 g; Refill: 0    Patient is negative for COVID, flu and strep on rapid testing today.  Strep culture sent and pending.  Will treat accordingly.  Recommended COVID PCR swab due to exposure to COVID.  Patient declines COVID PCR.  No evidence of bacterial infection at this time.  Symptoms are likely viral or related to allergies.  Treatment with Zyrtec and Flonase as above.  Patient declines prescription cough medication.  Also recommend warm salt water gargles, Chloraseptic throat spray, Tylenol and ibuprofen.  Return and ER precautions advised.    Follow up:  Return if symptoms worsen or fail to improve.  Patient was given instructions and counseling regarding her condition or for health maintenance advice. Please see specific information pulled into the AVS if appropriate.

## 2023-11-27 NOTE — TELEPHONE ENCOUNTER
pt states James was wanting to change her med that she takes for cholesterol to something different and she wants to approve that and ask that James go on and send that in

## 2023-11-28 DIAGNOSIS — E78.2 MIXED HYPERLIPIDEMIA: Primary | ICD-10-CM

## 2023-11-28 RX ORDER — ROSUVASTATIN CALCIUM 10 MG/1
10 TABLET, COATED ORAL NIGHTLY
Qty: 30 TABLET | Refills: 5 | Status: SHIPPED | OUTPATIENT
Start: 2023-11-28

## 2023-11-29 LAB — BACTERIA SPEC AEROBE CULT: NORMAL

## 2023-12-15 ENCOUNTER — OFFICE VISIT (OUTPATIENT)
Dept: FAMILY MEDICINE CLINIC | Age: 38
End: 2023-12-15
Payer: COMMERCIAL

## 2023-12-15 VITALS
BODY MASS INDEX: 45.96 KG/M2 | TEMPERATURE: 98.3 F | SYSTOLIC BLOOD PRESSURE: 124 MMHG | HEART RATE: 94 BPM | WEIGHT: 286 LBS | OXYGEN SATURATION: 95 % | DIASTOLIC BLOOD PRESSURE: 74 MMHG | HEIGHT: 66 IN

## 2023-12-15 DIAGNOSIS — R52 BODY ACHES: ICD-10-CM

## 2023-12-15 DIAGNOSIS — F41.9 ANXIETY: ICD-10-CM

## 2023-12-15 DIAGNOSIS — R00.2 PALPITATIONS: ICD-10-CM

## 2023-12-15 DIAGNOSIS — J02.0 STREP PHARYNGITIS: Primary | ICD-10-CM

## 2023-12-15 DIAGNOSIS — R09.81 NASAL CONGESTION: ICD-10-CM

## 2023-12-15 DIAGNOSIS — D64.9 ANEMIA, UNSPECIFIED TYPE: ICD-10-CM

## 2023-12-15 DIAGNOSIS — E78.2 MIXED HYPERLIPIDEMIA: ICD-10-CM

## 2023-12-15 DIAGNOSIS — E55.9 VITAMIN D DEFICIENCY: ICD-10-CM

## 2023-12-15 DIAGNOSIS — Z13.1 DIABETES MELLITUS SCREENING: ICD-10-CM

## 2023-12-15 DIAGNOSIS — E66.01 CLASS 3 SEVERE OBESITY WITH SERIOUS COMORBIDITY AND BODY MASS INDEX (BMI) OF 45.0 TO 49.9 IN ADULT, UNSPECIFIED OBESITY TYPE: ICD-10-CM

## 2023-12-15 PROBLEM — K21.9 GASTROESOPHAGEAL REFLUX DISEASE WITHOUT ESOPHAGITIS: Status: RESOLVED | Noted: 2022-12-01 | Resolved: 2023-12-15

## 2023-12-15 PROBLEM — Z23 NEED FOR COVID-19 VACCINE: Status: RESOLVED | Noted: 2023-06-15 | Resolved: 2023-12-15

## 2023-12-15 PROBLEM — J30.9 ALLERGIC RHINITIS: Status: ACTIVE | Noted: 2023-12-15

## 2023-12-15 PROBLEM — R05.1 ACUTE COUGH: Status: ACTIVE | Noted: 2023-12-15

## 2023-12-15 LAB
EXPIRATION DATE: ABNORMAL
EXPIRATION DATE: NORMAL
FLUAV AG UPPER RESP QL IA.RAPID: NOT DETECTED
FLUBV AG UPPER RESP QL IA.RAPID: NOT DETECTED
INTERNAL CONTROL: ABNORMAL
INTERNAL CONTROL: NORMAL
Lab: ABNORMAL
Lab: NORMAL
S PYO AG THROAT QL: POSITIVE
SARS-COV-2 AG UPPER RESP QL IA.RAPID: NOT DETECTED

## 2023-12-15 PROCEDURE — 87428 SARSCOV & INF VIR A&B AG IA: CPT | Performed by: NURSE PRACTITIONER

## 2023-12-15 PROCEDURE — 99214 OFFICE O/P EST MOD 30 MIN: CPT | Performed by: NURSE PRACTITIONER

## 2023-12-15 PROCEDURE — 87880 STREP A ASSAY W/OPTIC: CPT | Performed by: NURSE PRACTITIONER

## 2023-12-15 RX ORDER — AMOXICILLIN 500 MG/1
1000 CAPSULE ORAL 2 TIMES DAILY
Qty: 40 CAPSULE | Refills: 0 | Status: SHIPPED | OUTPATIENT
Start: 2023-12-15

## 2023-12-15 RX ORDER — ESCITALOPRAM OXALATE 20 MG/1
20 TABLET ORAL DAILY
Qty: 90 TABLET | Refills: 1 | Status: SHIPPED | OUTPATIENT
Start: 2023-12-15

## 2023-12-15 RX ORDER — METOPROLOL SUCCINATE 25 MG/1
25 TABLET, EXTENDED RELEASE ORAL DAILY
Qty: 90 TABLET | Refills: 1 | Status: SHIPPED | OUTPATIENT
Start: 2023-12-15

## 2023-12-15 RX ORDER — BUSPIRONE HYDROCHLORIDE 10 MG/1
10 TABLET ORAL 2 TIMES DAILY PRN
Qty: 180 TABLET | Refills: 1 | Status: SHIPPED | OUTPATIENT
Start: 2023-12-15

## 2023-12-15 NOTE — ASSESSMENT & PLAN NOTE
Take amoxicillin as directed until completed.  Replace toothbrush after several days.  Follow-up if symptoms do not resolve.  Negative for COVID and flu.

## 2023-12-15 NOTE — PROGRESS NOTES
"Chief Complaint  Hyperlipidemia (6 month follow up ), Prediabetes, Heartburn, Obesity, Cough (Patient c/o this has been going on for 2 days), Nasal Congestion, Generalized Body Aches, Sore Throat, and Fever    Subjective          Katrina Bahena presents to [facility]     Patient is a 38-year-old female who is here today to follow-up regarding hyperlipidemia.  Treatment was changed from pravastatin to Crestor 10 mg at bedtime after labs reviewed November 6.  Tolerating without side effects.  Will plan to repeat a fasting lipid panel in February.    Regarding obesity and history of prediabetes, was taking metformin extended release 500 mg daily.  Had to discontinue due to diarrhea as a side effect.  Patient is achieving some success with intentional weight loss with lifestyle factors.    Iron deficiency anemia currently stable on iron supplement.  History of vitamin D deficiency currently on a daily multivitamin.    Anxiety is currently stable on buspirone 10 mg twice daily and Lexapro 20 mg daily.  Denies side effects and requests refills.    Tachycardia currently stable on metoprolol extended release 25 mg daily.  Denies side effects and requests refills.    2-day onset of subjective fever when checked with a thermometer, nasal congestion, chills, body aches, and sore throat.  Exposed to illness by students.  Currently taking ibuprofen as needed.     Lmp 3-4 wks ago  Objective   Vital Signs:   Vitals:    12/15/23 1456   BP: 124/74   BP Location: Left arm   Patient Position: Sitting   Cuff Size: Large Adult   Pulse: 94   Temp: 98.3 °F (36.8 °C)   TempSrc: Oral   SpO2: 95%   Weight: 130 kg (286 lb)   Height: 167.6 cm (65.98\")       Wt Readings from Last 3 Encounters:   12/15/23 130 kg (286 lb)   11/27/23 132 kg (292 lb)   06/15/23 (!) 138 kg (305 lb)      BP Readings from Last 3 Encounters:   12/15/23 124/74   11/27/23 134/90   06/15/23 130/90       Body mass index is 46.19 kg/m².      "        Physical Exam  Vitals reviewed.   Constitutional:       General: She is not in acute distress.     Appearance: Normal appearance. She is well-developed. She is obese.   HENT:      Right Ear: Tympanic membrane normal.      Left Ear: Tympanic membrane normal.      Mouth/Throat:      Pharynx: Oropharynx is clear. No oropharyngeal exudate or posterior oropharyngeal erythema.   Neck:      Thyroid: No thyromegaly.   Cardiovascular:      Rate and Rhythm: Normal rate and regular rhythm.      Heart sounds: Normal heart sounds.   Pulmonary:      Effort: Pulmonary effort is normal.      Breath sounds: Normal breath sounds.   Musculoskeletal:      Right lower leg: No edema.      Left lower leg: No edema.   Skin:     General: Skin is warm and dry.   Neurological:      General: No focal deficit present.      Mental Status: She is alert.   Psychiatric:         Attention and Perception: Attention normal.         Mood and Affect: Mood and affect normal.         Behavior: Behavior normal.           Current Outpatient Medications:     Accu-Chek Softclix Lancets lancets, Use as instructed once daily, Disp: 100 each, Rfl: 0    baclofen (LIORESAL) 10 MG tablet, Take 1 tablet by mouth 3 (Three) Times a Day., Disp: 30 tablet, Rfl: 0    Blood Glucose Monitoring Suppl (Accu-Chek Guide) w/Device kit, Use as instructed once daily, Disp: 1 kit, Rfl: 0    busPIRone (BUSPAR) 10 MG tablet, Take 1 tablet by mouth 2 (Two) Times a Day As Needed for anxiety, Disp: 180 tablet, Rfl: 1    cetirizine (zyrTEC) 10 MG tablet, Take 1 tablet by mouth Daily., Disp: 30 tablet, Rfl: 0    dicyclomine (BENTYL) 20 MG tablet, Take 1 tablet by mouth Every 6 (Six) Hours As Needed., Disp: 30 tablet, Rfl: 0    escitalopram (LEXAPRO) 20 MG tablet, Take 1 tablet by mouth Daily., Disp: 90 tablet, Rfl: 1    ferrous sulfate 325 (65 FE) MG tablet, Take 1 tablet by mouth Daily With Breakfast., Disp: , Rfl:     fluticasone (FLONASE) 50 MCG/ACT nasal spray, Use 2 sprays  into each nostril as directed by provider Daily., Disp: 16 g, Rfl: 0    glucose blood (Accu-Chek Guide) test strip, Use as instructed once daily, Disp: 100 each, Rfl: 0    levalbuterol (XOPENEX HFA) 45 MCG/ACT inhaler, Inhale 1-2 puffs Every 4 (Four) Hours As Needed for Wheezing., Disp: 15 g, Rfl: 5    metoprolol succinate XL (Toprol XL) 25 MG 24 hr tablet, Take 1 tablet by mouth Daily., Disp: 90 tablet, Rfl: 1    ondansetron ODT (Zofran ODT) 4 MG disintegrating tablet, Place 1 to 2 tablets under the tongue Every 6 (Six) Hours As Needed for nausea and vomiting., Disp: 15 tablet, Rfl: 0    rosuvastatin (Crestor) 10 MG tablet, Take 1 tablet by mouth Every Night., Disp: 30 tablet, Rfl: 5    amoxicillin (AMOXIL) 500 MG capsule, Take 2 capsules by mouth 2 (Two) Times a Day., Disp: 40 capsule, Rfl: 0   Past Medical History:   Diagnosis Date    Anemia     Anxiety     Heart palpitations      Allergies   Allergen Reactions    Metformin Hcl Er Diarrhea               Result Review :     Common labs          5/16/2023    10:39 6/12/2023    13:14 11/6/2023    10:22   Common Labs   Glucose  94  88    BUN  10  12    Creatinine  0.84  0.76    Sodium  138  139    Potassium  4.7  4.3    Chloride  100  102    Calcium  9.2  9.0    Albumin  4.5  4.2    Total Bilirubin  0.3  0.3    Alkaline Phosphatase  63  65    AST (SGOT)  31  20    ALT (SGPT)  37  23    WBC  6.43     Hemoglobin  13.9     Hematocrit  42.0     Platelets  292     Total Cholesterol 233   222    Triglycerides 326   292    HDL Cholesterol 43   46    LDL Cholesterol  132   125    Hemoglobin A1C  5.60          XR Chest PA & Lateral    Result Date: 9/28/2023    1. No acute cardiopulmonary abnormality.      VANE NOONAN MD       Electronically Signed and Approved By: VANE NOONAN MD on 9/28/2023 at 17:31                      Social History     Tobacco Use   Smoking Status Never   Smokeless Tobacco Never           Assessment and Plan    Diagnoses and all orders for this  visit:    1. Strep pharyngitis (Primary)  Assessment & Plan:  Take amoxicillin as directed until completed.  Replace toothbrush after several days.  Follow-up if symptoms do not resolve.  Negative for COVID and flu.    Orders:  -     POCT SARS-CoV-2 Antigen CARMINE + Flu  -     POCT rapid strep A  -     amoxicillin (AMOXIL) 500 MG capsule; Take 2 capsules by mouth 2 (Two) Times a Day.  Dispense: 40 capsule; Refill: 0    2. Nasal congestion  -     POCT SARS-CoV-2 Antigen CARMINE + Flu    3. Body aches  -     POCT SARS-CoV-2 Antigen CARMINE + Flu    4. Mixed hyperlipidemia  -     Comprehensive metabolic panel; Future  -     Lipid panel; Future    5. Class 3 severe obesity with serious comorbidity and body mass index (BMI) of 45.0 to 49.9 in adult, unspecified obesity type  Assessment & Plan:  Success with lifestyle factors.       6. Anxiety  -     escitalopram (LEXAPRO) 20 MG tablet; Take 1 tablet by mouth Daily.  Dispense: 90 tablet; Refill: 1  -     busPIRone (BUSPAR) 10 MG tablet; Take 1 tablet by mouth 2 (Two) Times a Day As Needed for anxiety  Dispense: 180 tablet; Refill: 1    7. Anemia, unspecified type  -     CBC w AUTO Differential; Future  -     Iron and TIBC; Future    8. Palpitations  -     metoprolol succinate XL (Toprol XL) 25 MG 24 hr tablet; Take 1 tablet by mouth Daily.  Dispense: 90 tablet; Refill: 1    9. Vitamin D deficiency  -     Vitamin D 25 hydroxy; Future    10. Diabetes mellitus screening  -     Hemoglobin A1c; Future        Follow Up    No follow-ups on file.  Patient was given instructions and counseling regarding her condition or for health maintenance advice. Please see specific information pulled into the AVS if appropriate.

## 2024-03-20 ENCOUNTER — TELEPHONE (OUTPATIENT)
Dept: FAMILY MEDICINE CLINIC | Age: 39
End: 2024-03-20
Payer: COMMERCIAL

## 2024-06-19 DIAGNOSIS — F41.9 ANXIETY: ICD-10-CM

## 2024-06-19 NOTE — TELEPHONE ENCOUNTER
Caller: Josef Katrina ASHLYN    Relationship: Self    Best call back number: [phone]    Requested Prescriptions:   Requested Prescriptions     Pending Prescriptions Disp Refills    busPIRone (BUSPAR) 10 MG tablet 180 tablet 1     Sig: Take 1 tablet by mouth 2 (Two) Times a Day As Needed for anxiety    escitalopram (LEXAPRO) 20 MG tablet 90 tablet 1     Sig: Take 1 tablet by mouth Daily.        Pharmacy where request should be sent:    [facility] [phone]       Last office visit with prescribing clinician: 12/15/2023   Last telemedicine visit with prescribing clinician: Visit date not found   Next office visit with prescribing clinician: 7/17/2024     Additional details provided by patient: PATIENT IS OUT OF ESCITALOPRAM AND HAS AN UPCOMING APPOINTMENT    Does the patient have less than a 3 day supply:  [x] Yes  [] No    Would you like a call back once the refill request has been completed: [] Yes [] No    If the office needs to give you a call back, can they leave a voicemail: [x] Yes [] No    Silvana Chang   06/19/24 12:04 EDT

## 2024-06-20 RX ORDER — ESCITALOPRAM OXALATE 20 MG/1
20 TABLET ORAL DAILY
Qty: 30 TABLET | Refills: 0 | Status: SHIPPED | OUTPATIENT
Start: 2024-06-20

## 2024-06-20 RX ORDER — BUSPIRONE HYDROCHLORIDE 10 MG/1
10 TABLET ORAL 2 TIMES DAILY PRN
Qty: 60 TABLET | Refills: 0 | Status: SHIPPED | OUTPATIENT
Start: 2024-06-20

## 2024-06-21 ENCOUNTER — LAB (OUTPATIENT)
Dept: LAB | Facility: HOSPITAL | Age: 39
End: 2024-06-21
Payer: COMMERCIAL

## 2024-06-21 DIAGNOSIS — E55.9 VITAMIN D DEFICIENCY: ICD-10-CM

## 2024-06-21 DIAGNOSIS — D64.9 ANEMIA, UNSPECIFIED TYPE: ICD-10-CM

## 2024-06-21 DIAGNOSIS — E78.2 MIXED HYPERLIPIDEMIA: ICD-10-CM

## 2024-06-21 DIAGNOSIS — Z13.1 DIABETES MELLITUS SCREENING: ICD-10-CM

## 2024-06-21 LAB
25(OH)D3 SERPL-MCNC: 29.8 NG/ML (ref 30–100)
ALBUMIN SERPL-MCNC: 4.3 G/DL (ref 3.5–5.2)
ALBUMIN/GLOB SERPL: 1.5 G/DL
ALP SERPL-CCNC: 67 U/L (ref 39–117)
ALT SERPL W P-5'-P-CCNC: 15 U/L (ref 1–33)
ANION GAP SERPL CALCULATED.3IONS-SCNC: 10.7 MMOL/L (ref 5–15)
AST SERPL-CCNC: 16 U/L (ref 1–32)
BASOPHILS # BLD AUTO: 0.02 10*3/MM3 (ref 0–0.2)
BASOPHILS NFR BLD AUTO: 0.4 % (ref 0–1.5)
BILIRUB SERPL-MCNC: 0.3 MG/DL (ref 0–1.2)
BUN SERPL-MCNC: 11 MG/DL (ref 6–20)
BUN/CREAT SERPL: 12.1 (ref 7–25)
CALCIUM SPEC-SCNC: 9 MG/DL (ref 8.6–10.5)
CHLORIDE SERPL-SCNC: 102 MMOL/L (ref 98–107)
CHOLEST SERPL-MCNC: 242 MG/DL (ref 0–200)
CO2 SERPL-SCNC: 26.3 MMOL/L (ref 22–29)
CREAT SERPL-MCNC: 0.91 MG/DL (ref 0.57–1)
DEPRECATED RDW RBC AUTO: 45.9 FL (ref 37–54)
EGFRCR SERPLBLD CKD-EPI 2021: 84 ML/MIN/1.73
EOSINOPHIL # BLD AUTO: 0.2 10*3/MM3 (ref 0–0.4)
EOSINOPHIL NFR BLD AUTO: 4 % (ref 0.3–6.2)
ERYTHROCYTE [DISTWIDTH] IN BLOOD BY AUTOMATED COUNT: 15.7 % (ref 12.3–15.4)
GLOBULIN UR ELPH-MCNC: 2.9 GM/DL
GLUCOSE SERPL-MCNC: 107 MG/DL (ref 65–99)
HBA1C MFR BLD: 5.6 % (ref 4.8–5.6)
HCT VFR BLD AUTO: 41.3 % (ref 34–46.6)
HDLC SERPL-MCNC: 44 MG/DL (ref 40–60)
HGB BLD-MCNC: 13 G/DL (ref 12–15.9)
IMM GRANULOCYTES # BLD AUTO: 0.02 10*3/MM3 (ref 0–0.05)
IMM GRANULOCYTES NFR BLD AUTO: 0.4 % (ref 0–0.5)
IRON 24H UR-MRATE: 44 MCG/DL (ref 37–145)
IRON SATN MFR SERPL: 8 % (ref 20–50)
LDLC SERPL CALC-MCNC: 150 MG/DL (ref 0–100)
LDLC/HDLC SERPL: 3.3 {RATIO}
LYMPHOCYTES # BLD AUTO: 1.18 10*3/MM3 (ref 0.7–3.1)
LYMPHOCYTES NFR BLD AUTO: 23.6 % (ref 19.6–45.3)
MCH RBC QN AUTO: 25.2 PG (ref 26.6–33)
MCHC RBC AUTO-ENTMCNC: 31.5 G/DL (ref 31.5–35.7)
MCV RBC AUTO: 80 FL (ref 79–97)
MONOCYTES # BLD AUTO: 0.28 10*3/MM3 (ref 0.1–0.9)
MONOCYTES NFR BLD AUTO: 5.6 % (ref 5–12)
NEUTROPHILS NFR BLD AUTO: 3.31 10*3/MM3 (ref 1.7–7)
NEUTROPHILS NFR BLD AUTO: 66 % (ref 42.7–76)
PLATELET # BLD AUTO: 278 10*3/MM3 (ref 140–450)
PMV BLD AUTO: 9.6 FL (ref 6–12)
POTASSIUM SERPL-SCNC: 4.3 MMOL/L (ref 3.5–5.2)
PROT SERPL-MCNC: 7.2 G/DL (ref 6–8.5)
RBC # BLD AUTO: 5.16 10*6/MM3 (ref 3.77–5.28)
SODIUM SERPL-SCNC: 139 MMOL/L (ref 136–145)
TIBC SERPL-MCNC: 519 MCG/DL (ref 298–536)
TRANSFERRIN SERPL-MCNC: 348 MG/DL (ref 200–360)
TRIGL SERPL-MCNC: 264 MG/DL (ref 0–150)
VLDLC SERPL-MCNC: 48 MG/DL (ref 5–40)
WBC NRBC COR # BLD AUTO: 5.01 10*3/MM3 (ref 3.4–10.8)

## 2024-06-21 PROCEDURE — 85025 COMPLETE CBC W/AUTO DIFF WBC: CPT

## 2024-06-21 PROCEDURE — 80053 COMPREHEN METABOLIC PANEL: CPT

## 2024-06-21 PROCEDURE — 83036 HEMOGLOBIN GLYCOSYLATED A1C: CPT

## 2024-06-21 PROCEDURE — 36415 COLL VENOUS BLD VENIPUNCTURE: CPT

## 2024-06-21 PROCEDURE — 84466 ASSAY OF TRANSFERRIN: CPT

## 2024-06-21 PROCEDURE — 82306 VITAMIN D 25 HYDROXY: CPT

## 2024-06-21 PROCEDURE — 80061 LIPID PANEL: CPT

## 2024-06-21 PROCEDURE — 83540 ASSAY OF IRON: CPT

## 2024-06-24 NOTE — PROGRESS NOTES
Cholesterol levels have increased.  You currently are taking Crestor 10 mg at bedtime.  I will recommend that we increase Crestor to 20 mg at bedtime if you are agreeable.  We can discuss further at your upcoming appointment.  Globin A1c has remained normal.  Kidney and liver function are normal.  Hemoglobin and hematocrit are normal but iron is still little low.  Vitamin D level is improved and almost normal.  We will discuss current supplement doses at upcoming appointment

## 2024-07-25 DIAGNOSIS — E78.2 MIXED HYPERLIPIDEMIA: ICD-10-CM

## 2024-07-25 DIAGNOSIS — F41.9 ANXIETY: ICD-10-CM

## 2024-07-25 NOTE — TELEPHONE ENCOUNTER
Caller: Katrina Bahena    Relationship: Self    Best call back number: [phone]    Requested Prescriptions:   Requested Prescriptions     Pending Prescriptions Disp Refills    rosuvastatin (Crestor) 10 MG tablet 30 tablet 5     Sig: Take 1 tablet by mouth Every Night.    busPIRone (BUSPAR) 10 MG tablet 60 tablet 0     Sig: Take 1 tablet by mouth 2 (Two) Times a Day As Needed for anxiety    escitalopram (LEXAPRO) 20 MG tablet 30 tablet 0     Sig: Take 1 tablet by mouth Daily.        Pharmacy where request should be sent: [facility]     Last office visit with prescribing clinician: 12/15/2023   Last telemedicine visit with prescribing clinician: Visit date not found   Next office visit with prescribing clinician: 8/28/2024     Additional details provided by patient:   PATIENT IS OUT OF LEXAPRO AND ALMOST OUT OF THE OTHER TWO MEDICATIONS LISTED. SHE WOULD LIKE ENOUGH UNTIL 08/28/2024 APPOINTMENT AT LEAST.     Does the patient have less than a 3 day supply:  [x] Yes  [] No    Would you like a call back once the refill request has been completed: [] Yes [x] No    If the office needs to give you a call back, can they leave a voicemail: [] Yes [x] No    Silvana Vieira   07/25/24 08:38 EDT

## 2024-07-26 RX ORDER — BUSPIRONE HYDROCHLORIDE 10 MG/1
10 TABLET ORAL 2 TIMES DAILY PRN
Qty: 60 TABLET | Refills: 0 | Status: SHIPPED | OUTPATIENT
Start: 2024-07-26

## 2024-07-26 RX ORDER — ESCITALOPRAM OXALATE 20 MG/1
20 TABLET ORAL DAILY
Qty: 30 TABLET | Refills: 0 | Status: SHIPPED | OUTPATIENT
Start: 2024-07-26

## 2024-07-26 RX ORDER — ROSUVASTATIN CALCIUM 10 MG/1
10 TABLET, COATED ORAL NIGHTLY
Qty: 30 TABLET | Refills: 5 | Status: SHIPPED | OUTPATIENT
Start: 2024-07-26

## 2024-08-28 ENCOUNTER — OFFICE VISIT (OUTPATIENT)
Dept: FAMILY MEDICINE CLINIC | Age: 39
End: 2024-08-28
Payer: COMMERCIAL

## 2024-08-28 VITALS
OXYGEN SATURATION: 95 % | WEIGHT: 293 LBS | TEMPERATURE: 97.5 F | SYSTOLIC BLOOD PRESSURE: 130 MMHG | DIASTOLIC BLOOD PRESSURE: 78 MMHG | HEIGHT: 66 IN | HEART RATE: 104 BPM | BODY MASS INDEX: 47.09 KG/M2

## 2024-08-28 DIAGNOSIS — N92.6 MENSTRUAL IRREGULARITY: ICD-10-CM

## 2024-08-28 DIAGNOSIS — R00.2 PALPITATIONS: ICD-10-CM

## 2024-08-28 DIAGNOSIS — E78.2 MIXED HYPERLIPIDEMIA: ICD-10-CM

## 2024-08-28 DIAGNOSIS — R12 HEARTBURN: Primary | ICD-10-CM

## 2024-08-28 DIAGNOSIS — Z30.011 ENCOUNTER FOR INITIAL PRESCRIPTION OF CONTRACEPTIVE PILLS: ICD-10-CM

## 2024-08-28 DIAGNOSIS — F41.9 ANXIETY: ICD-10-CM

## 2024-08-28 PROBLEM — J02.0 STREP PHARYNGITIS: Status: RESOLVED | Noted: 2023-12-15 | Resolved: 2024-08-28

## 2024-08-28 PROBLEM — R53.83 OTHER FATIGUE: Status: RESOLVED | Noted: 2022-02-25 | Resolved: 2024-08-28

## 2024-08-28 PROBLEM — R05.1 ACUTE COUGH: Status: RESOLVED | Noted: 2023-12-15 | Resolved: 2024-08-28

## 2024-08-28 PROBLEM — R52 BODY ACHES: Status: RESOLVED | Noted: 2023-12-15 | Resolved: 2024-08-28

## 2024-08-28 PROBLEM — R09.81 NASAL CONGESTION: Status: RESOLVED | Noted: 2023-12-15 | Resolved: 2024-08-28

## 2024-08-28 LAB
B-HCG UR QL: NEGATIVE
EXPIRATION DATE: NORMAL
INTERNAL NEGATIVE CONTROL: NORMAL
INTERNAL POSITIVE CONTROL: NORMAL
Lab: NORMAL

## 2024-08-28 PROCEDURE — 99214 OFFICE O/P EST MOD 30 MIN: CPT | Performed by: NURSE PRACTITIONER

## 2024-08-28 PROCEDURE — 81025 URINE PREGNANCY TEST: CPT | Performed by: NURSE PRACTITIONER

## 2024-08-28 RX ORDER — NORETHINDRONE ACETATE AND ETHINYL ESTRADIOL, ETHINYL ESTRADIOL AND FERROUS FUMARATE 1MG-10(24)
1 KIT ORAL DAILY
Qty: 28 TABLET | Refills: 2 | Status: SHIPPED | OUTPATIENT
Start: 2024-08-28

## 2024-08-28 RX ORDER — METOPROLOL SUCCINATE 25 MG/1
25 TABLET, EXTENDED RELEASE ORAL DAILY
Qty: 90 TABLET | Refills: 1 | Status: SHIPPED | OUTPATIENT
Start: 2024-08-28

## 2024-08-28 RX ORDER — ESCITALOPRAM OXALATE 20 MG/1
20 TABLET ORAL DAILY
Qty: 90 TABLET | Refills: 1 | Status: SHIPPED | OUTPATIENT
Start: 2024-08-28

## 2024-08-28 RX ORDER — BUSPIRONE HYDROCHLORIDE 10 MG/1
10 TABLET ORAL 2 TIMES DAILY PRN
Qty: 180 TABLET | Refills: 1 | Status: SHIPPED | OUTPATIENT
Start: 2024-08-28

## 2024-08-28 NOTE — ASSESSMENT & PLAN NOTE
Low estrogen birth control pill provided to patient per her request.  Taking birth control pills over age 35 and has held a higher risk of developing blood clots.  Patient does not smoke.  Referral to gynecology for further evaluation and management.  Checking thyroid function.

## 2024-08-28 NOTE — ASSESSMENT & PLAN NOTE
Recommend she have an upper scope done.  Patient would like to rule out H.  Pylori with a breath test first.  Upper scope is always test for H. pylori but she be interested to see if she would be a candidate for antibiotic treatment prior to getting upper scope done.  If H. pylori is negative she is agreeable to being referred for an upper scope.  Consider restarting proton pump inhibitor after H. pylori test is obtained

## 2024-08-28 NOTE — PROGRESS NOTES
"Chief Complaint  Contraception (Is wanting referral, has had period for 3 weeks still ongoing first day was around 8/7/24) and Heartburn (Wants to discuss medication options OTC tums isnt helping )    Subjective          Katrina Bahena presents to [facility]     Patient is a 39 year old female here today to request referral to GYN for pap smear but would like to start bcp for menstrual regulation first.   Last pap more than 3 yrs ago.  Hx PCOS. Took bcp many yrs ago.  And stopped due to night sweats.  Vaginal bleeding noted daily x 3 weeks but not heavy bleeding.    Taking rosuvastatin regularly at bedtime since labs done in June.      Heartburn symptoms have increased.  No longer taking omeprazole.  Tums not effective.  Has never had upper scope done.    For hx of tachycardia patient has taken metoprolol xl 25 mg once daily.   Denies side effects and requests refills.  Objective   Vital Signs:   Vitals:    08/28/24 1611 08/28/24 1643   BP: 141/99 130/78   BP Location: Left arm Left arm   Patient Position: Sitting Sitting   Cuff Size: Large Adult Large Adult   Pulse: 104    Temp: 97.5 °F (36.4 °C)    TempSrc: Temporal    SpO2: 95%    Weight: (!) 137 kg (302 lb)    Height: 167.6 cm (65.98\")        Wt Readings from Last 3 Encounters:   08/28/24 (!) 137 kg (302 lb)   12/15/23 130 kg (286 lb)   11/27/23 132 kg (292 lb)      BP Readings from Last 3 Encounters:   08/28/24 130/78   12/15/23 124/74   11/27/23 134/90       Body mass index is 48.77 kg/m².           Physical Exam  Vitals reviewed.   Constitutional:       General: She is not in acute distress.     Appearance: Normal appearance. She is well-developed. She is obese.   Neck:      Thyroid: No thyromegaly.   Cardiovascular:      Rate and Rhythm: Normal rate and regular rhythm.      Heart sounds: Normal heart sounds.   Pulmonary:      Effort: Pulmonary effort is normal.      Breath sounds: Normal breath sounds.   Musculoskeletal:      " Right lower leg: No edema.      Left lower leg: No edema.   Skin:     General: Skin is warm and dry.   Neurological:      General: No focal deficit present.      Mental Status: She is alert.   Psychiatric:         Attention and Perception: Attention normal.         Mood and Affect: Mood and affect normal.         Behavior: Behavior normal.           Current Outpatient Medications:     Accu-Chek Softclix Lancets lancets, Use as instructed once daily, Disp: 100 each, Rfl: 0    Blood Glucose Monitoring Suppl (Accu-Chek Guide) w/Device kit, Use as instructed once daily, Disp: 1 kit, Rfl: 0    busPIRone (BUSPAR) 10 MG tablet, Take 1 tablet by mouth 2 (Two) Times a Day As Needed for anxiety, Disp: 60 tablet, Rfl: 0    cetirizine (zyrTEC) 10 MG tablet, Take 1 tablet by mouth Daily., Disp: 30 tablet, Rfl: 0    dicyclomine (BENTYL) 20 MG tablet, Take 1 tablet by mouth Every 6 (Six) Hours As Needed., Disp: 30 tablet, Rfl: 0    escitalopram (LEXAPRO) 20 MG tablet, Take 1 tablet by mouth Daily., Disp: 30 tablet, Rfl: 0    ferrous sulfate 325 (65 FE) MG tablet, Take 1 tablet by mouth Daily With Breakfast., Disp: , Rfl:     fluticasone (FLONASE) 50 MCG/ACT nasal spray, Use 2 sprays into each nostril as directed by provider Daily., Disp: 16 g, Rfl: 0    glucose blood (Accu-Chek Guide) test strip, Use as instructed once daily, Disp: 100 each, Rfl: 0    levalbuterol (XOPENEX HFA) 45 MCG/ACT inhaler, Inhale 1-2 puffs Every 4 (Four) Hours As Needed for Wheezing., Disp: 15 g, Rfl: 5    metoprolol succinate XL (Toprol XL) 25 MG 24 hr tablet, Take 1 tablet by mouth Daily., Disp: 90 tablet, Rfl: 1    ondansetron ODT (Zofran ODT) 4 MG disintegrating tablet, Place 1 to 2 tablets under the tongue Every 6 (Six) Hours As Needed for nausea and vomiting., Disp: 15 tablet, Rfl: 0    rosuvastatin (Crestor) 10 MG tablet, Take 1 tablet by mouth Every Night., Disp: 30 tablet, Rfl: 5    Norethin-Eth Estrad-Fe Biphas (Lo Loestrin Fe) 1 MG-10 MCG / 10  MCG tablet, Take 1 tablet by mouth Daily., Disp: 28 tablet, Rfl: 2   Past Medical History:   Diagnosis Date    Anemia     Anxiety     Heart palpitations      Allergies   Allergen Reactions    Metformin Hcl Er Diarrhea               Result Review :     Common labs          11/6/2023    10:22 6/21/2024    10:06   Common Labs   Glucose 88  107    BUN 12  11    Creatinine 0.76  0.91    Sodium 139  139    Potassium 4.3  4.3    Chloride 102  102    Calcium 9.0  9.0    Albumin 4.2  4.3    Total Bilirubin 0.3  0.3    Alkaline Phosphatase 65  67    AST (SGOT) 20  16    ALT (SGPT) 23  15    WBC  5.01    Hemoglobin  13.0    Hematocrit  41.3    Platelets  278    Total Cholesterol 222  242    Triglycerides 292  264    HDL Cholesterol 46  44    LDL Cholesterol  125  150    Hemoglobin A1C  5.60         No Images in the past 120 days found..           Social History     Tobacco Use   Smoking Status Never   Smokeless Tobacco Never           Assessment and Plan    Diagnoses and all orders for this visit:    1. Heartburn (Primary)  Assessment & Plan:  Recommend she have an upper scope done.  Patient would like to rule out H.  Pylori with a breath test first.  Upper scope is always test for H. pylori but she be interested to see if she would be a candidate for antibiotic treatment prior to getting upper scope done.  If H. pylori is negative she is agreeable to being referred for an upper scope.  Consider restarting proton pump inhibitor after H. pylori test is obtained    Orders:  -     H. Pylori Breath Test - Breath, Lung; Future    2. Mixed hyperlipidemia  -     Lipid panel; Future    3. Menstrual irregularity  Assessment & Plan:  Low estrogen birth control pill provided to patient per her request.  Taking birth control pills over age 35 and has held a higher risk of developing blood clots.  Patient does not smoke.  Referral to gynecology for further evaluation and management.  Checking thyroid function.    Orders:  -     Cancel: POC  HCG, Urine, By Visual Color  -     TSH Rfx On Abnormal To Free T4; Future  -     POCT pregnancy, urine  -     Ambulatory Referral to Obstetrics / Gynecology  -     Norethin-Eth Estrad-Fe Biphas (Lo Loestrin Fe) 1 MG-10 MCG / 10 MCG tablet; Take 1 tablet by mouth Daily.  Dispense: 28 tablet; Refill: 2    4. Encounter for initial prescription of contraceptive pills    5. Palpitations  Assessment & Plan:  Follow-up in 6 months or sooner if concerns.    Orders:  -     metoprolol succinate XL (Toprol XL) 25 MG 24 hr tablet; Take 1 tablet by mouth Daily.  Dispense: 90 tablet; Refill: 1        Follow Up    No follow-ups on file.  Patient was given instructions and counseling regarding her condition or for health maintenance advice. Please see specific information pulled into the AVS if appropriate.

## 2024-09-30 ENCOUNTER — TELEPHONE (OUTPATIENT)
Dept: FAMILY MEDICINE CLINIC | Age: 39
End: 2024-09-30
Payer: COMMERCIAL

## 2024-09-30 NOTE — LETTER
October 2, 2024    Katrina ASHLYN Bahena  [address] [place] [address]        Just a friendly reminder you have active lab orders pending from your last office visit.  These orders are in your chart, simply come to the lab at your convenience between 7am-6pm Mon-Fri to have these completed. No appointment is needed. These are fasting labs meaning nothing to eat or drink after midnight the night before your test; however you may drink all the water or black coffee you would like the morning of your lab work.        Thank you,     [facility]                      LIBRADO Shahid

## 2024-10-11 ENCOUNTER — LAB (OUTPATIENT)
Dept: LAB | Facility: HOSPITAL | Age: 39
End: 2024-10-11
Payer: COMMERCIAL

## 2024-10-11 DIAGNOSIS — E78.2 MIXED HYPERLIPIDEMIA: ICD-10-CM

## 2024-10-11 DIAGNOSIS — N92.6 MENSTRUAL IRREGULARITY: ICD-10-CM

## 2024-10-11 DIAGNOSIS — R12 HEARTBURN: ICD-10-CM

## 2024-10-11 LAB
CHOLEST SERPL-MCNC: 225 MG/DL (ref 0–200)
HDLC SERPL-MCNC: 41 MG/DL (ref 40–60)
LDLC SERPL CALC-MCNC: 118 MG/DL (ref 0–100)
LDLC/HDLC SERPL: 2.64 {RATIO}
TRIGL SERPL-MCNC: 379 MG/DL (ref 0–150)
TSH SERPL DL<=0.05 MIU/L-ACNC: 3.04 UIU/ML (ref 0.27–4.2)
VLDLC SERPL-MCNC: 66 MG/DL (ref 5–40)

## 2024-10-11 PROCEDURE — 80061 LIPID PANEL: CPT

## 2024-10-11 PROCEDURE — 36415 COLL VENOUS BLD VENIPUNCTURE: CPT

## 2024-10-11 PROCEDURE — 84443 ASSAY THYROID STIM HORMONE: CPT

## 2024-10-11 PROCEDURE — 83013 H PYLORI (C-13) BREATH: CPT

## 2024-10-13 LAB — UREA BREATH TEST QL: NEGATIVE

## 2024-10-14 NOTE — PROGRESS NOTES
Cholesterol is improved on rosuvastatin 10 mg at bedtime but not yet to goal.  It is reasonable to increase rosuvastatin to 20 mg at bedtime.  You do not have H. pylori stomach bacteria.  Thyroid function is normal.  Please let me know if you are agreeable to increasing rosuvastatin to 20 mg at bedtime and proceeding with a referral for a possible upper scope due to persistent heartburn and acid reflux symptoms.

## 2024-12-04 ENCOUNTER — TELEPHONE (OUTPATIENT)
Dept: FAMILY MEDICINE CLINIC | Age: 39
End: 2024-12-04
Payer: COMMERCIAL

## 2024-12-04 NOTE — TELEPHONE ENCOUNTER
Caller: Katrina Bahena    Relationship: Self    Best call back number: [phone]    What medication are you requesting: ROSUVASTATIN 20MG    What are your current symptoms: N/A    How long have you been experiencing symptoms: N/A    Have you had these symptoms before:    [] Yes  [] No    Have you been treated for these symptoms before:   [] Yes  [] No    If a prescription is needed, what is your preferred pharmacy and phone number: [facility] - [place]     Additional notes:PATIENT IS OK WITH THE INCREASE IN DOSAGE TO THE 20MG PER HER CONVERSATION WITH ZEINA ESCOBAR ON HER LAST VISIT. PLEASE SEND NEW PRESCRIPTION WITH INCREASED DOSAGE TO PHARMACY ASAP.

## 2024-12-05 DIAGNOSIS — N92.6 MENSTRUAL IRREGULARITY: ICD-10-CM

## 2024-12-05 DIAGNOSIS — E78.2 MIXED HYPERLIPIDEMIA: Primary | ICD-10-CM

## 2024-12-05 RX ORDER — NORETHINDRONE ACETATE AND ETHINYL ESTRADIOL, ETHINYL ESTRADIOL AND FERROUS FUMARATE 1MG-10(24)
1 KIT ORAL DAILY
Qty: 28 TABLET | Refills: 1 | Status: SHIPPED | OUTPATIENT
Start: 2024-12-05

## 2024-12-05 RX ORDER — ROSUVASTATIN CALCIUM 20 MG/1
20 TABLET, COATED ORAL DAILY
Qty: 30 TABLET | Refills: 5 | Status: SHIPPED | OUTPATIENT
Start: 2024-12-05 | End: 2025-06-03

## 2024-12-05 NOTE — TELEPHONE ENCOUNTER
Rosuvastatin 20 mg at bedtime sent to [facility] pharmacy.  May recheck fasting labs a few days before next appt in march if she would like (orders in chart).

## 2025-03-04 ENCOUNTER — OFFICE VISIT (OUTPATIENT)
Dept: FAMILY MEDICINE CLINIC | Age: 40
End: 2025-03-04
Payer: COMMERCIAL

## 2025-03-04 VITALS
SYSTOLIC BLOOD PRESSURE: 132 MMHG | DIASTOLIC BLOOD PRESSURE: 88 MMHG | WEIGHT: 293 LBS | HEART RATE: 82 BPM | OXYGEN SATURATION: 97 % | TEMPERATURE: 97.7 F | BODY MASS INDEX: 47.09 KG/M2 | HEIGHT: 66 IN

## 2025-03-04 DIAGNOSIS — R00.2 PALPITATIONS: ICD-10-CM

## 2025-03-04 DIAGNOSIS — R05.1 ACUTE COUGH: Primary | ICD-10-CM

## 2025-03-04 DIAGNOSIS — E78.2 MIXED HYPERLIPIDEMIA: ICD-10-CM

## 2025-03-04 DIAGNOSIS — E66.01 CLASS 3 SEVERE OBESITY WITH SERIOUS COMORBIDITY AND BODY MASS INDEX (BMI) OF 45.0 TO 49.9 IN ADULT, UNSPECIFIED OBESITY TYPE: ICD-10-CM

## 2025-03-04 DIAGNOSIS — Z13.1 DIABETES MELLITUS SCREENING: ICD-10-CM

## 2025-03-04 DIAGNOSIS — E66.813 CLASS 3 SEVERE OBESITY WITH SERIOUS COMORBIDITY AND BODY MASS INDEX (BMI) OF 45.0 TO 49.9 IN ADULT, UNSPECIFIED OBESITY TYPE: ICD-10-CM

## 2025-03-04 DIAGNOSIS — F41.9 ANXIETY: ICD-10-CM

## 2025-03-04 PROCEDURE — 99214 OFFICE O/P EST MOD 30 MIN: CPT | Performed by: NURSE PRACTITIONER

## 2025-03-04 RX ORDER — BUSPIRONE HYDROCHLORIDE 10 MG/1
10 TABLET ORAL 2 TIMES DAILY PRN
Qty: 180 TABLET | Refills: 1 | Status: SHIPPED | OUTPATIENT
Start: 2025-03-04

## 2025-03-04 RX ORDER — CITALOPRAM HYDROBROMIDE 20 MG/1
20 TABLET ORAL DAILY
Qty: 30 TABLET | Refills: 5 | Status: SHIPPED | OUTPATIENT
Start: 2025-03-04

## 2025-03-04 RX ORDER — ROSUVASTATIN CALCIUM 20 MG/1
20 TABLET, COATED ORAL DAILY
Qty: 30 TABLET | Refills: 5 | Status: SHIPPED | OUTPATIENT
Start: 2025-03-04 | End: 2025-08-31

## 2025-03-04 RX ORDER — DEXTROMETHORPHAN HYDROBROMIDE AND PROMETHAZINE HYDROCHLORIDE 15; 6.25 MG/5ML; MG/5ML
5 SYRUP ORAL 4 TIMES DAILY PRN
Qty: 120 ML | Refills: 0 | Status: SHIPPED | OUTPATIENT
Start: 2025-03-04

## 2025-03-04 RX ORDER — METOPROLOL SUCCINATE 25 MG/1
25 TABLET, EXTENDED RELEASE ORAL DAILY
Qty: 90 TABLET | Refills: 1 | Status: SHIPPED | OUTPATIENT
Start: 2025-03-04

## 2025-03-04 NOTE — ASSESSMENT & PLAN NOTE
Promethazine DM may cause drowsiness.  Follow-up if symptoms do not resolve.  Recommend Coricidin HBP over-the-counter

## 2025-03-04 NOTE — PROGRESS NOTES
Chief Complaint  Heartburn (6 month follow up ), Hyperlipidemia, and Anxiety    Subjective          Katrina Bahena presents to [facility]     Patient is a 39-year-old female who is here today to follow-up regarding anxiety.  Has taken Lexapro 20 mg daily and buspirone 10 mg twice per day since before 2018.  1 other unspecified medicine tried previously that did not work well.  Cannot recall the name of that particular medication.  Feels as if anxiety medicines are no longer as effective.    Regarding obesity, in June her hemoglobin A1c was normal at 5.6.  She previously tried Wegovy from a weight loss clinic with good results but it became too costly to continue going to the weight loss clinic.  He would like to try Wegovy again.  She feels as if she is exhausted methods with diet and exercise.  Family history is negative for medullary thyroid cancer.  Patient has never had a history of pancreatitis.  Patient currently denies any stomach upset or heartburn.  Is aware of the gastrointestinal risk with use of a GLP-1 medication.  If insurance does not cover we will refer to bariatrics, Dr. Maxi Santamaria.    Patient has had to reschedule her gynecology appointment and has not been taking birth control pills in about 3 weeks.  She does intend to follow-up with gynecology soon for menstrual cycle regulation.  Her last menstrual cycle was almost 4 weeks ago.    Tachycardia stable on metoprolol 25 mg daily.  Denies side effects and requests refills.    Patient has had a cough for about 10 days that is congested but not very productive.  Denies fever, wheezing, or any other associated symptoms.  Cough does interrupt sleep.  Defers concern for COVID or flu.    Hyperlipidemia she is taking rosuvastatin 20 mg at bedtime.  Denies side effects and requests refills.     Objective   Vital Signs:   Vitals:    03/04/25 1534 03/04/25 1600   BP: 133/91 132/88   BP Location: Left arm Right arm   Patient  "Position: Sitting Sitting   Cuff Size: Large Adult Large Adult   Pulse: 82    Temp: 97.7 °F (36.5 °C)    TempSrc: Temporal    SpO2: 97%    Weight: (!) 138 kg (305 lb)    Height: 167.6 cm (65.98\")        Wt Readings from Last 3 Encounters:   03/04/25 (!) 138 kg (305 lb)   08/28/24 (!) 137 kg (302 lb)   12/15/23 130 kg (286 lb)      BP Readings from Last 3 Encounters:   03/04/25 132/88   08/28/24 130/78   12/15/23 124/74       Body mass index is 49.26 kg/m².           Physical Exam  Vitals reviewed.   Constitutional:       General: She is not in acute distress.     Appearance: Normal appearance. She is well-developed. She is obese.   HENT:      Right Ear: Tympanic membrane normal.      Left Ear: Tympanic membrane normal.   Neck:      Thyroid: No thyromegaly.   Cardiovascular:      Rate and Rhythm: Normal rate and regular rhythm.      Heart sounds: Normal heart sounds.   Pulmonary:      Effort: Pulmonary effort is normal.      Breath sounds: Normal breath sounds.   Musculoskeletal:      Right lower leg: No edema.      Left lower leg: No edema.   Skin:     General: Skin is warm and dry.   Neurological:      General: No focal deficit present.      Mental Status: She is alert.   Psychiatric:         Attention and Perception: Attention normal.         Mood and Affect: Mood and affect normal.         Behavior: Behavior normal.           Current Outpatient Medications:     Accu-Chek Softclix Lancets lancets, Use as instructed once daily, Disp: 100 each, Rfl: 0    Blood Glucose Monitoring Suppl (Accu-Chek Guide) w/Device kit, Use as instructed once daily, Disp: 1 kit, Rfl: 0    busPIRone (BUSPAR) 10 MG tablet, Take 1 tablet by mouth 2 (Two) Times a Day As Needed for anxiety, Disp: 180 tablet, Rfl: 1    cetirizine (zyrTEC) 10 MG tablet, Take 1 tablet by mouth Daily., Disp: 30 tablet, Rfl: 0    dicyclomine (BENTYL) 20 MG tablet, Take 1 tablet by mouth Every 6 (Six) Hours As Needed., Disp: 30 tablet, Rfl: 0    ferrous sulfate " 325 (65 FE) MG tablet, Take 1 tablet by mouth Daily With Breakfast., Disp: , Rfl:     fluticasone (FLONASE) 50 MCG/ACT nasal spray, Use 2 sprays into each nostril as directed by provider Daily., Disp: 16 g, Rfl: 0    glucose blood (Accu-Chek Guide) test strip, Use as instructed once daily, Disp: 100 each, Rfl: 0    levalbuterol (XOPENEX HFA) 45 MCG/ACT inhaler, Inhale 1-2 puffs Every 4 (Four) Hours As Needed for Wheezing., Disp: 15 g, Rfl: 5    metoprolol succinate XL (Toprol XL) 25 MG 24 hr tablet, Take 1 tablet by mouth Daily., Disp: 90 tablet, Rfl: 1    Norethin-Eth Estrad-Fe Biphas (Lo Loestrin Fe) 1 MG-10 MCG / 10 MCG tablet, Take 1 tablet by mouth Daily., Disp: 28 tablet, Rfl: 1    ondansetron ODT (Zofran ODT) 4 MG disintegrating tablet, Place 1 to 2 tablets under the tongue Every 6 (Six) Hours As Needed for nausea and vomiting., Disp: 15 tablet, Rfl: 0    rosuvastatin (CRESTOR) 20 MG tablet, Take 1 tablet by mouth Daily, Disp: 30 tablet, Rfl: 5    amoxicillin-clavulanate (AUGMENTIN) 875-125 MG per tablet, Take 1 tablet by mouth 2 (Two) Times a Day., Disp: 20 tablet, Rfl: 0    citalopram (CeleXA) 20 MG tablet, Take 1 tablet by mouth Daily., Disp: 30 tablet, Rfl: 5    promethazine-dextromethorphan (PROMETHAZINE-DM) 6.25-15 MG/5ML syrup, Take 5 mL by mouth 4 (Four) Times a Day As Needed for Cough. May cause drowsiness, Disp: 120 mL, Rfl: 0    Semaglutide-Weight Management 0.25 MG/0.5ML solution auto-injector, Inject 0.25 mg under the skin into the appropriate area as directed 1 (One) Time Per Week., Disp: 2 mL, Rfl: 2   Past Medical History:   Diagnosis Date    Anemia     Anxiety     Heart palpitations     Obesity      Allergies   Allergen Reactions    Metformin Hcl Er Diarrhea               Result Review :     Common labs          6/21/2024    10:06 10/11/2024    12:01   Common Labs   Glucose 107     BUN 11     Creatinine 0.91     Sodium 139     Potassium 4.3     Chloride 102     Calcium 9.0     Albumin 4.3      Total Bilirubin 0.3     Alkaline Phosphatase 67     AST (SGOT) 16     ALT (SGPT) 15     WBC 5.01     Hemoglobin 13.0     Hematocrit 41.3     Platelets 278     Total Cholesterol 242  225    Triglycerides 264  379    HDL Cholesterol 44  41    LDL Cholesterol  150  118    Hemoglobin A1C 5.60          No Images in the past 120 days found..           Social History     Tobacco Use   Smoking Status Never   Smokeless Tobacco Never           Assessment and Plan    Diagnoses and all orders for this visit:    1. Acute cough (Primary)  Assessment & Plan:  Promethazine DM may cause drowsiness.  Follow-up if symptoms do not resolve.  Recommend Coricidin HBP over-the-counter    Orders:  -     amoxicillin-clavulanate (AUGMENTIN) 875-125 MG per tablet; Take 1 tablet by mouth 2 (Two) Times a Day.  Dispense: 20 tablet; Refill: 0  -     promethazine-dextromethorphan (PROMETHAZINE-DM) 6.25-15 MG/5ML syrup; Take 5 mL by mouth 4 (Four) Times a Day As Needed for Cough. May cause drowsiness  Dispense: 120 mL; Refill: 0    2. Mixed hyperlipidemia  -     rosuvastatin (CRESTOR) 20 MG tablet; Take 1 tablet by mouth Daily  Dispense: 30 tablet; Refill: 5    3. Palpitations  -     metoprolol succinate XL (Toprol XL) 25 MG 24 hr tablet; Take 1 tablet by mouth Daily.  Dispense: 90 tablet; Refill: 1    4. Anxiety  Assessment & Plan:  Stop Lexapro and changed to citalopram.  Continue buspirone at current dose.  Follow-up if not improving within the next 4 to 6 weeks or sooner if concerns.    Orders:  -     citalopram (CeleXA) 20 MG tablet; Take 1 tablet by mouth Daily.  Dispense: 30 tablet; Refill: 5  -     busPIRone (BUSPAR) 10 MG tablet; Take 1 tablet by mouth 2 (Two) Times a Day As Needed for anxiety  Dispense: 180 tablet; Refill: 1    5. Class 3 severe obesity with serious comorbidity and body mass index (BMI) of 45.0 to 49.9 in adult, unspecified obesity type  -     Semaglutide-Weight Management 0.25 MG/0.5ML solution auto-injector; Inject  0.25 mg under the skin into the appropriate area as directed 1 (One) Time Per Week.  Dispense: 2 mL; Refill: 2    6. Diabetes mellitus screening  -     Hemoglobin A1c; Future        Follow Up    Return if symptoms worsen or fail to improve.  Patient was given instructions and counseling regarding her condition or for health maintenance advice. Please see specific information pulled into the AVS if appropriate.

## 2025-03-04 NOTE — ASSESSMENT & PLAN NOTE
Stop Lexapro and changed to citalopram.  Continue buspirone at current dose.  Follow-up if not improving within the next 4 to 6 weeks or sooner if concerns.

## 2025-03-10 ENCOUNTER — PRIOR AUTHORIZATION (OUTPATIENT)
Dept: FAMILY MEDICINE CLINIC | Age: 40
End: 2025-03-10
Payer: COMMERCIAL

## 2025-03-14 ENCOUNTER — LAB (OUTPATIENT)
Dept: LAB | Facility: HOSPITAL | Age: 40
End: 2025-03-14
Payer: COMMERCIAL

## 2025-03-14 DIAGNOSIS — Z13.1 DIABETES MELLITUS SCREENING: ICD-10-CM

## 2025-03-14 DIAGNOSIS — E78.2 MIXED HYPERLIPIDEMIA: ICD-10-CM

## 2025-03-14 LAB
ALBUMIN SERPL-MCNC: 4.1 G/DL (ref 3.5–5.2)
ALBUMIN/GLOB SERPL: 1.6 G/DL
ALP SERPL-CCNC: 70 U/L (ref 39–117)
ALT SERPL W P-5'-P-CCNC: 22 U/L (ref 1–33)
ANION GAP SERPL CALCULATED.3IONS-SCNC: 8 MMOL/L (ref 5–15)
AST SERPL-CCNC: 19 U/L (ref 1–32)
BILIRUB SERPL-MCNC: 0.3 MG/DL (ref 0–1.2)
BUN SERPL-MCNC: 9 MG/DL (ref 6–20)
BUN/CREAT SERPL: 7.8 (ref 7–25)
CALCIUM SPEC-SCNC: 9.2 MG/DL (ref 8.6–10.5)
CHLORIDE SERPL-SCNC: 101 MMOL/L (ref 98–107)
CHOLEST SERPL-MCNC: 177 MG/DL (ref 0–200)
CO2 SERPL-SCNC: 29 MMOL/L (ref 22–29)
CREAT SERPL-MCNC: 1.16 MG/DL (ref 0.57–1)
EGFRCR SERPLBLD CKD-EPI 2021: 62.4 ML/MIN/1.73
GLOBULIN UR ELPH-MCNC: 2.5 GM/DL
GLUCOSE SERPL-MCNC: 91 MG/DL (ref 65–99)
HBA1C MFR BLD: 5.9 % (ref 4.8–5.6)
HDLC SERPL-MCNC: 41 MG/DL (ref 40–60)
LDLC SERPL CALC-MCNC: 100 MG/DL (ref 0–100)
LDLC/HDLC SERPL: 2.28 {RATIO}
POTASSIUM SERPL-SCNC: 4.5 MMOL/L (ref 3.5–5.2)
PROT SERPL-MCNC: 6.6 G/DL (ref 6–8.5)
SODIUM SERPL-SCNC: 138 MMOL/L (ref 136–145)
TRIGL SERPL-MCNC: 212 MG/DL (ref 0–150)
VLDLC SERPL-MCNC: 36 MG/DL (ref 5–40)

## 2025-03-14 PROCEDURE — 83036 HEMOGLOBIN GLYCOSYLATED A1C: CPT

## 2025-03-14 PROCEDURE — 36415 COLL VENOUS BLD VENIPUNCTURE: CPT

## 2025-03-14 PROCEDURE — 80061 LIPID PANEL: CPT

## 2025-03-14 PROCEDURE — 80053 COMPREHEN METABOLIC PANEL: CPT

## 2025-03-28 ENCOUNTER — TELEPHONE (OUTPATIENT)
Dept: FAMILY MEDICINE CLINIC | Age: 40
End: 2025-03-28
Payer: COMMERCIAL

## 2025-03-28 NOTE — TELEPHONE ENCOUNTER
Spoke to pt, she is agreeable to 30mg and has plenty and will let me know before she runs out how she is doing.

## 2025-03-28 NOTE — TELEPHONE ENCOUNTER
Caller: Katrina Bahena    Relationship: Self    Best call back number: [phone]    What medication are you requesting:INCREASED DOSAGE OF  CITALOPRAM     What are your current symptoms: ANXIETY       If a prescription is needed, what is your preferred pharmacy and phone number: [facility] - [place]     Additional notes: PATIENT WAS ADVISED AT APPOINTMENT THAT THE DOSAGE COULD BE INCREASED IF NECESSARY AND SHE STILL FEELING A LOT OF ANXIETY AND WOULD LIKE THE HIGHER DOSAGE   PLEASE CONTACT AND ADVISE IF SHE NEEDS TO BE SEEN AGAIN FIRST

## 2025-03-28 NOTE — TELEPHONE ENCOUNTER
Current dose of citalopram is 20 mg daily.  The next available strength tablet is 40 mg/day.  Would she like to take citalopram 20 mg 1-1/2 tablets daily?  This would equal 30 mg/day.

## 2025-04-04 ENCOUNTER — LAB (OUTPATIENT)
Dept: LAB | Facility: HOSPITAL | Age: 40
End: 2025-04-04
Payer: COMMERCIAL

## 2025-04-04 ENCOUNTER — OFFICE VISIT (OUTPATIENT)
Dept: FAMILY MEDICINE CLINIC | Age: 40
End: 2025-04-04
Payer: COMMERCIAL

## 2025-04-04 VITALS
DIASTOLIC BLOOD PRESSURE: 81 MMHG | SYSTOLIC BLOOD PRESSURE: 123 MMHG | HEIGHT: 66 IN | BODY MASS INDEX: 47.09 KG/M2 | HEART RATE: 77 BPM | WEIGHT: 293 LBS | OXYGEN SATURATION: 99 % | TEMPERATURE: 98.3 F

## 2025-04-04 DIAGNOSIS — I49.9 CARDIAC ARRHYTHMIA, UNSPECIFIED CARDIAC ARRHYTHMIA TYPE: ICD-10-CM

## 2025-04-04 DIAGNOSIS — R00.2 PALPITATION: Primary | ICD-10-CM

## 2025-04-04 DIAGNOSIS — R00.2 PALPITATION: ICD-10-CM

## 2025-04-04 PROBLEM — R05.1 ACUTE COUGH: Status: RESOLVED | Noted: 2025-03-04 | Resolved: 2025-04-04

## 2025-04-04 PROBLEM — Z13.1 DIABETES MELLITUS SCREENING: Status: RESOLVED | Noted: 2023-12-15 | Resolved: 2025-04-04

## 2025-04-04 LAB
BASOPHILS # BLD AUTO: 0.01 10*3/MM3 (ref 0–0.2)
BASOPHILS NFR BLD AUTO: 0.2 % (ref 0–1.5)
DEPRECATED RDW RBC AUTO: 40 FL (ref 37–54)
EOSINOPHIL # BLD AUTO: 0.19 10*3/MM3 (ref 0–0.4)
EOSINOPHIL NFR BLD AUTO: 2.9 % (ref 0.3–6.2)
ERYTHROCYTE [DISTWIDTH] IN BLOOD BY AUTOMATED COUNT: 12.6 % (ref 12.3–15.4)
HCT VFR BLD AUTO: 43.7 % (ref 34–46.6)
HGB BLD-MCNC: 14.4 G/DL (ref 12–15.9)
IMM GRANULOCYTES # BLD AUTO: 0.01 10*3/MM3 (ref 0–0.05)
IMM GRANULOCYTES NFR BLD AUTO: 0.2 % (ref 0–0.5)
LYMPHOCYTES # BLD AUTO: 1.84 10*3/MM3 (ref 0.7–3.1)
LYMPHOCYTES NFR BLD AUTO: 28.1 % (ref 19.6–45.3)
MAGNESIUM SERPL-MCNC: 2.1 MG/DL (ref 1.6–2.6)
MCH RBC QN AUTO: 28.3 PG (ref 26.6–33)
MCHC RBC AUTO-ENTMCNC: 33 G/DL (ref 31.5–35.7)
MCV RBC AUTO: 86 FL (ref 79–97)
MONOCYTES # BLD AUTO: 0.5 10*3/MM3 (ref 0.1–0.9)
MONOCYTES NFR BLD AUTO: 7.6 % (ref 5–12)
NEUTROPHILS NFR BLD AUTO: 4 10*3/MM3 (ref 1.7–7)
NEUTROPHILS NFR BLD AUTO: 61 % (ref 42.7–76)
PLATELET # BLD AUTO: 264 10*3/MM3 (ref 140–450)
PMV BLD AUTO: 9.8 FL (ref 6–12)
RBC # BLD AUTO: 5.08 10*6/MM3 (ref 3.77–5.28)
TSH SERPL DL<=0.05 MIU/L-ACNC: 2.72 UIU/ML (ref 0.27–4.2)
WBC NRBC COR # BLD AUTO: 6.55 10*3/MM3 (ref 3.4–10.8)

## 2025-04-04 PROCEDURE — 84443 ASSAY THYROID STIM HORMONE: CPT

## 2025-04-04 PROCEDURE — 36415 COLL VENOUS BLD VENIPUNCTURE: CPT

## 2025-04-04 PROCEDURE — 83735 ASSAY OF MAGNESIUM: CPT

## 2025-04-04 PROCEDURE — 99214 OFFICE O/P EST MOD 30 MIN: CPT | Performed by: NURSE PRACTITIONER

## 2025-04-04 PROCEDURE — 93000 ELECTROCARDIOGRAM COMPLETE: CPT | Performed by: NURSE PRACTITIONER

## 2025-04-04 PROCEDURE — 85025 COMPLETE CBC W/AUTO DIFF WBC: CPT

## 2025-04-04 NOTE — ASSESSMENT & PLAN NOTE
Refer to cardiology so that echocardiogram and stress stress and complete evaluation may be performed.  Emergency room if any worsening in the meantime.  Continue metoprolol 25 mg daily.  Hesitate to increase dose of medicine as it could alter results with cardiology referral.  Further treatment pending lab results.  Monitor very closely.

## 2025-04-04 NOTE — PROGRESS NOTES
"Chief Complaint  Palpitations and Shortness of Breath    Subjective          Katrina Bahena presents to [facility]     Patient is a 39-year-old female who has been on metoprolol for sinus tachycardia since 2021.  Sinus tachycardia was confirmed by Holter monitor with maximum heart rate of 109.  No other significant findings were noted at that time (November 2021).  Since symptoms improved on metoprolol 25 mg daily did not proceed with getting echocardiogram.  Symptoms of fast heart rate have increased again and occasionally feels a skipped beat without chest pain.  Is only briefly short of breath if she notices a skipped beat.  Denies any other concerns.     Objective   Vital Signs:   Vitals:    04/04/25 1346   BP: 123/81   BP Location: Right arm   Patient Position: Sitting   Pulse: 77   Temp: 98.3 °F (36.8 °C)   TempSrc: Oral   SpO2: 99%   Weight: (!) 138 kg (304 lb 9.6 oz)   Height: 167.6 cm (65.98\")       Wt Readings from Last 3 Encounters:   04/04/25 (!) 138 kg (304 lb 9.6 oz)   03/04/25 (!) 138 kg (305 lb)   08/28/24 (!) 137 kg (302 lb)      BP Readings from Last 3 Encounters:   04/04/25 123/81   03/04/25 132/88   08/28/24 130/78       Body mass index is 49.19 kg/m².             Physical Exam  Vitals reviewed.   Constitutional:       General: She is not in acute distress.     Appearance: Normal appearance. She is well-developed. She is obese.   Cardiovascular:      Rate and Rhythm: Normal rate and regular rhythm.      Heart sounds: Normal heart sounds.   Pulmonary:      Effort: Pulmonary effort is normal.      Breath sounds: Normal breath sounds.   Musculoskeletal:      Right lower leg: No edema.      Left lower leg: No edema.   Skin:     General: Skin is warm and dry.   Neurological:      General: No focal deficit present.      Mental Status: She is alert.   Psychiatric:         Attention and Perception: Attention normal.         Mood and Affect: Mood and affect normal.         " Behavior: Behavior normal.           Current Outpatient Medications:     Accu-Chek Softclix Lancets lancets, Use as instructed once daily, Disp: 100 each, Rfl: 0    amoxicillin-clavulanate (AUGMENTIN) 875-125 MG per tablet, Take 1 tablet by mouth 2 (Two) Times a Day., Disp: 20 tablet, Rfl: 0    Blood Glucose Monitoring Suppl (Accu-Chek Guide) w/Device kit, Use as instructed once daily, Disp: 1 kit, Rfl: 0    busPIRone (BUSPAR) 10 MG tablet, Take 1 tablet by mouth 2 (Two) Times a Day As Needed for anxiety, Disp: 180 tablet, Rfl: 1    cetirizine (zyrTEC) 10 MG tablet, Take 1 tablet by mouth Daily., Disp: 30 tablet, Rfl: 0    citalopram (CeleXA) 20 MG tablet, Take 1 tablet by mouth Daily., Disp: 30 tablet, Rfl: 5    dicyclomine (BENTYL) 20 MG tablet, Take 1 tablet by mouth Every 6 (Six) Hours As Needed., Disp: 30 tablet, Rfl: 0    ferrous sulfate 325 (65 FE) MG tablet, Take 1 tablet by mouth Daily With Breakfast., Disp: , Rfl:     fluticasone (FLONASE) 50 MCG/ACT nasal spray, Use 2 sprays into each nostril as directed by provider Daily., Disp: 16 g, Rfl: 0    glucose blood (Accu-Chek Guide) test strip, Use as instructed once daily, Disp: 100 each, Rfl: 0    levalbuterol (XOPENEX HFA) 45 MCG/ACT inhaler, Inhale 1-2 puffs Every 4 (Four) Hours As Needed for Wheezing., Disp: 15 g, Rfl: 5    metoprolol succinate XL (Toprol XL) 25 MG 24 hr tablet, Take 1 tablet by mouth Daily., Disp: 90 tablet, Rfl: 1    Norethin-Eth Estrad-Fe Biphas (Lo Loestrin Fe) 1 MG-10 MCG / 10 MCG tablet, Take 1 tablet by mouth Daily., Disp: 28 tablet, Rfl: 1    ondansetron ODT (Zofran ODT) 4 MG disintegrating tablet, Place 1 to 2 tablets under the tongue Every 6 (Six) Hours As Needed for nausea and vomiting., Disp: 15 tablet, Rfl: 0    rosuvastatin (CRESTOR) 20 MG tablet, Take 1 tablet by mouth Daily, Disp: 30 tablet, Rfl: 5    Semaglutide-Weight Management 0.25 MG/0.5ML solution auto-injector, Inject 0.25 mg under the skin into the appropriate area  as directed 1 (One) Time Per Week., Disp: 2 mL, Rfl: 2   Past Medical History:   Diagnosis Date    Anemia     Anxiety     Heart palpitations     Obesity      Allergies   Allergen Reactions    Metformin Hcl Er Diarrhea               Result Review :     Common labs          10/11/2024    12:01 3/14/2025    12:36 4/4/2025    14:36   Common Labs   Glucose  91     BUN  9     Creatinine  1.16     Sodium  138     Potassium  4.5     Chloride  101     Calcium  9.2     Albumin  4.1     Total Bilirubin  0.3     Alkaline Phosphatase  70     AST (SGOT)  19     ALT (SGPT)  22     WBC   6.55    Hemoglobin   14.4    Hematocrit   43.7    Platelets   264    Total Cholesterol 225  177     Triglycerides 379  212     HDL Cholesterol 41  41     LDL Cholesterol  118  100     Hemoglobin A1C  5.90          No Images in the past 120 days found..         ECG 12 Lead    Date/Time: 4/4/2025 2:52 PM  Performed by: Suly Ramirez APRN    Authorized by: Suly Ramirez APRN  Comparison: not compared with previous ECG   Previous ECG: no previous ECG available  Rhythm: sinus rhythm  Ectopy: multifocal PVCs  Rate: normal  BPM: 77  Conduction: conduction normal    Clinical impression: abnormal EKG         Social History     Tobacco Use   Smoking Status Never   Smokeless Tobacco Never           Assessment and Plan    Diagnoses and all orders for this visit:    1. Palpitation (Primary)  Assessment & Plan:  Refer to cardiology so that echocardiogram and stress stress and complete evaluation may be performed.  Emergency room if any worsening in the meantime.  Continue metoprolol 25 mg daily.  Hesitate to increase dose of medicine as it could alter results with cardiology referral.  Further treatment pending lab results.  Monitor very closely.    Orders:  -     ECG 12 Lead  -     CBC w AUTO Differential; Future  -     TSH; Future  -     Magnesium; Future  -     Holter monitor - 48 hour; Future  -     Ambulatory Referral to Cardiology    2. Cardiac  arrhythmia, unspecified cardiac arrhythmia type  -     Ambulatory Referral to Cardiology        Follow Up    Return if symptoms worsen or fail to improve.  Patient was given instructions and counseling regarding her condition or for health maintenance advice. Please see specific information pulled into the AVS if appropriate.

## 2025-04-08 DIAGNOSIS — E66.01 CLASS 3 SEVERE OBESITY WITH SERIOUS COMORBIDITY AND BODY MASS INDEX (BMI) OF 45.0 TO 49.9 IN ADULT, UNSPECIFIED OBESITY TYPE: Primary | ICD-10-CM

## 2025-04-08 DIAGNOSIS — E66.813 CLASS 3 SEVERE OBESITY WITH SERIOUS COMORBIDITY AND BODY MASS INDEX (BMI) OF 45.0 TO 49.9 IN ADULT, UNSPECIFIED OBESITY TYPE: Primary | ICD-10-CM

## 2025-05-02 ENCOUNTER — TELEMEDICINE (OUTPATIENT)
Dept: FAMILY MEDICINE CLINIC | Age: 40
End: 2025-05-02
Payer: COMMERCIAL

## 2025-05-02 DIAGNOSIS — F41.9 ANXIETY: Primary | ICD-10-CM

## 2025-05-02 PROCEDURE — 99213 OFFICE O/P EST LOW 20 MIN: CPT | Performed by: NURSE PRACTITIONER

## 2025-05-02 NOTE — ASSESSMENT & PLAN NOTE
Form completed.  Therapist and psychiatrist state vaccine of forms have been delayed due to work volume.  Patient is signed CONSENT forms to receive information..  Continuous FMLA from April 11 through June 2.  Patient will provide any pertinent forms that need to be completed in addition to what is completed today.  Patient to continue work on behavioral modifications in addition to medication management by Mima Garcia for sertraline.  Follow-up if not improving.

## 2025-05-02 NOTE — PROGRESS NOTES
Chief Complaint  paperwork  (FMLA /Mychart video )    Subjective          Katrina Bahena presents to [facility]     Patient is a 39-year-old female who provides consent for televideo visit today.  Both audio and video are utilized.  Patient is present at her home in [place].  Provider is present in office in [place].  Patient has experienced anxiety since approximately 2014.  Has taken Lexapro and buspirone previously.  Had felt as if medications were no longer as effective and we recently changed to citalopram.  She started therapy with telehealth, with Bonnie Banda once a week whom she believes is based out of [place].  Currently having weekly therapy sessions.  During these sessions therapist diagnosed her with PTSD and recommended she see a psychiatrist.  Patient has had 1 visit with Mima [facility] with [facility] via telehealth and she is based out of [place].  Patient initiated therapy when she was starting to have panic attacks at work.  Mima Garcia has changed citalopram to sertraline and patient is working on building her coping mechanisms.  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  Both therapist and psychiatrist have advised her they cannot provide her work documentation but patient has signed consent forms for therapist and psychiatrist to release information to me for review.  Patient needs continuous FMLA due to anxiety and panic attacks related to PTSD.     Objective   Vital Signs:   There were no vitals filed for this visit.    Wt Readings from Last 3 Encounters:   04/04/25 (!) 138 kg (304 lb 9.6 oz)   03/04/25 (!) 138 kg (305 lb)   08/28/24 (!) 137 kg (302 lb)      BP Readings from Last 3 Encounters:   04/04/25 123/81   03/04/25 132/88   08/28/24 130/78       There is no height or weight on file to calculate BMI.             Physical Exam  Constitutional:       General: She is not in acute distress.      Appearance: Normal appearance. She is not ill-appearing.   Neurological:      General: No focal deficit present.      Mental Status: She is alert.   Psychiatric:         Mood and Affect: Mood normal.         Behavior: Behavior normal.         Thought Content: Thought content normal.         Judgment: Judgment normal.           Current Outpatient Medications:     Accu-Chek Softclix Lancets lancets, Use as instructed once daily, Disp: 100 each, Rfl: 0    Blood Glucose Monitoring Suppl (Accu-Chek Guide) w/Device kit, Use as instructed once daily, Disp: 1 kit, Rfl: 0    busPIRone (BUSPAR) 10 MG tablet, Take 1 tablet by mouth 2 (Two) Times a Day As Needed for anxiety, Disp: 180 tablet, Rfl: 1    cetirizine (zyrTEC) 10 MG tablet, Take 1 tablet by mouth Daily., Disp: 30 tablet, Rfl: 0    dicyclomine (BENTYL) 20 MG tablet, Take 1 tablet by mouth Every 6 (Six) Hours As Needed., Disp: 30 tablet, Rfl: 0    ferrous sulfate 325 (65 FE) MG tablet, Take 1 tablet by mouth Daily With Breakfast., Disp: , Rfl:     fluticasone (FLONASE) 50 MCG/ACT nasal spray, Use 2 sprays into each nostril as directed by provider Daily., Disp: 16 g, Rfl: 0    glucose blood (Accu-Chek Guide) test strip, Use as instructed once daily, Disp: 100 each, Rfl: 0    levalbuterol (XOPENEX HFA) 45 MCG/ACT inhaler, Inhale 1-2 puffs Every 4 (Four) Hours As Needed for Wheezing., Disp: 15 g, Rfl: 5    metoprolol succinate XL (Toprol XL) 25 MG 24 hr tablet, Take 1 tablet by mouth Daily., Disp: 90 tablet, Rfl: 1    ondansetron (ZOFRAN) 8 MG tablet, Take 1 tablet by mouth 3 (Three) Times a Day As Needed., Disp: 15 tablet, Rfl: 1    rosuvastatin (CRESTOR) 20 MG tablet, Take 1 tablet by mouth Daily, Disp: 30 tablet, Rfl: 5    sertraline (ZOLOFT) 50 MG tablet, Take 1 tablet by mouth Daily., Disp: 30 tablet, Rfl: 1    Norethin-Eth Estrad-Fe Biphas (Lo Loestrin Fe) 1 MG-10 MCG / 10 MCG tablet, Take 1 tablet by mouth Daily., Disp: 28 tablet, Rfl: 1   Past Medical History:    Diagnosis Date    Anemia     Anxiety     Heart palpitations     Obesity      Allergies   Allergen Reactions    Metformin Hcl Er Diarrhea               Result Review :     Common labs          10/11/2024    12:01 3/14/2025    12:36 4/4/2025    14:36   Common Labs   Glucose  91     BUN  9     Creatinine  1.16     Sodium  138     Potassium  4.5     Chloride  101     Calcium  9.2     Albumin  4.1     Total Bilirubin  0.3     Alkaline Phosphatase  70     AST (SGOT)  19     ALT (SGPT)  22     WBC   6.55    Hemoglobin   14.4    Hematocrit   43.7    Platelets   264    Total Cholesterol 225  177     Triglycerides 379  212     HDL Cholesterol 41  41     LDL Cholesterol  118  100     Hemoglobin A1C  5.90          No Images in the past 120 days found..           Social History     Tobacco Use   Smoking Status Never   Smokeless Tobacco Never           Assessment and Plan    There are no diagnoses linked to this encounter.    Follow Up    No follow-ups on file.  Patient was given instructions and counseling regarding her condition or for health maintenance advice. Please see specific information pulled into the AVS if appropriate.

## 2025-06-02 PROBLEM — J45.20 MILD INTERMITTENT ASTHMA WITHOUT COMPLICATION: Status: ACTIVE | Noted: 2025-06-02

## 2025-06-02 PROBLEM — R00.2 PALPITATION: Status: RESOLVED | Noted: 2025-04-04 | Resolved: 2025-06-02

## 2025-06-02 PROBLEM — N92.1 MENORRHAGIA WITH IRREGULAR CYCLE: Status: ACTIVE | Noted: 2025-06-02

## 2025-06-02 PROBLEM — R63.5 WEIGHT GAIN: Status: ACTIVE | Noted: 2025-06-02

## 2025-06-02 PROBLEM — E28.2 PCOS (POLYCYSTIC OVARIAN SYNDROME): Status: ACTIVE | Noted: 2025-06-02

## 2025-07-01 ENCOUNTER — DOCUMENTATION (OUTPATIENT)
Dept: FAMILY MEDICINE CLINIC | Age: 40
End: 2025-07-01
Payer: COMMERCIAL

## 2025-08-21 ENCOUNTER — TELEMEDICINE (OUTPATIENT)
Dept: FAMILY MEDICINE CLINIC | Facility: TELEHEALTH | Age: 40
End: 2025-08-21
Payer: COMMERCIAL

## 2025-08-21 DIAGNOSIS — J01.00 ACUTE MAXILLARY SINUSITIS, RECURRENCE NOT SPECIFIED: Primary | ICD-10-CM

## 2025-08-21 DIAGNOSIS — R05.1 ACUTE COUGH: ICD-10-CM

## 2025-08-21 RX ORDER — BROMPHENIRAMINE MALEATE, PSEUDOEPHEDRINE HYDROCHLORIDE, AND DEXTROMETHORPHAN HYDROBROMIDE 2; 30; 10 MG/5ML; MG/5ML; MG/5ML
10 SYRUP ORAL 4 TIMES DAILY PRN
Qty: 200 ML | Refills: 0 | Status: SHIPPED | OUTPATIENT
Start: 2025-08-21